# Patient Record
Sex: FEMALE | Race: WHITE | NOT HISPANIC OR LATINO | Employment: PART TIME | URBAN - METROPOLITAN AREA
[De-identification: names, ages, dates, MRNs, and addresses within clinical notes are randomized per-mention and may not be internally consistent; named-entity substitution may affect disease eponyms.]

---

## 2020-06-09 PROBLEM — Z01.419 WELL WOMAN EXAM: Status: ACTIVE | Noted: 2020-06-09

## 2020-06-15 ENCOUNTER — OFFICE VISIT (OUTPATIENT)
Dept: OBSTETRICS AND GYNECOLOGY | Facility: CLINIC | Age: 35
End: 2020-06-15

## 2020-06-15 VITALS
BODY MASS INDEX: 21.76 KG/M2 | WEIGHT: 130.6 LBS | HEIGHT: 65 IN | DIASTOLIC BLOOD PRESSURE: 68 MMHG | SYSTOLIC BLOOD PRESSURE: 126 MMHG

## 2020-06-15 DIAGNOSIS — Z63.0 PARTNER RELATIONAL PROBLEM: ICD-10-CM

## 2020-06-15 DIAGNOSIS — Z01.419 WELL WOMAN EXAM: Primary | ICD-10-CM

## 2020-06-15 PROCEDURE — 99385 PREV VISIT NEW AGE 18-39: CPT | Performed by: OBSTETRICS & GYNECOLOGY

## 2020-06-15 RX ORDER — ETONOGESTREL AND ETHINYL ESTRADIOL 11.7; 2.7 MG/1; MG/1
1 INSERT, EXTENDED RELEASE VAGINAL
COMMUNITY
End: 2020-06-15 | Stop reason: SDUPTHER

## 2020-06-15 RX ORDER — ETONOGESTREL AND ETHINYL ESTRADIOL 11.7; 2.7 MG/1; MG/1
1 INSERT, EXTENDED RELEASE VAGINAL
Qty: 3 EACH | Refills: 4 | Status: SHIPPED | OUTPATIENT
Start: 2020-06-15 | End: 2021-08-03 | Stop reason: SDUPTHER

## 2020-06-15 SDOH — SOCIAL STABILITY - SOCIAL INSECURITY: PROBLEMS IN RELATIONSHIP WITH SPOUSE OR PARTNER: Z63.0

## 2020-06-15 NOTE — PROGRESS NOTES
Subjective   Chief Complaint   Patient presents with   • Gynecologic Exam     annual; no c/o     Dary Crawley is a 35 y.o. year old  presenting to be seen for her annual exam.     SEXUAL Hx:  She is currently sexually active.  In the past year there there has been NO new sexual partners.    Condoms are never used.  She would like to be screened for STD's at today's exam. Her partner had an affair in .   Current birth control method: NuvaRing.  She is happy with her current method of contraception and does not want to discuss alternative methods of contraception.  MENSTRUAL Hx:  Patient's last menstrual period was 2020 (exact date).  In the past 6 months her cycles have been regular, predictable and occur monthly.  Her menstrual flow is typically normal.   Each month on average there are roughly 0 day(s) of very heavy flow.    Duration 3-4 days.  Intermenstrual bleeding is absent.    Post-coital bleeding is absent.  Dysmenorrhea: mild and is not affecting her activities of daily living  PMS: mild and is not affecting her activities of daily living  Her cycles are not a source of concern for her that she wishes to discuss today.  HEALTH Hx:  She exercises regularly: yes.  She wears her seat belt: yes.  She has concerns about domestic violence: no.  OTHER THINGS SHE WANTS TO DISCUSS TODAY:  Nothing else    The following portions of the patient's history were reviewed and updated as appropriate:problem list, current medications, allergies, past family history, past medical history, past social history and past surgical history.    Social History    Tobacco Use      Smoking status: Never Smoker      Smokeless tobacco: Never Used    Review of Systems  Constitutional POS: nothing reported    NEG: anorexia or night sweats   Genitourinary POS: nothing reported    NEG: dysuria or hematuria      Gastointestinal POS: nothing reported    NEG: bloating, change in bowel habits, melena or reflux symptoms  "  Integument POS: nothing reported    NEG: moles that are changing in size, shape, color or rashes   Breast POS: nothing reported    NEG: persistent breast lump, skin dimpling or nipple discharge        Objective   /68   Ht 165.1 cm (65\")   Wt 59.2 kg (130 lb 9.6 oz)   LMP 05/27/2020 (Exact Date)   Breastfeeding No   BMI 21.73 kg/m²     General:  well developed; well nourished  no acute distress   Skin:  No suspicious lesions seen   Thyroid: normal to inspection and palpation   Breasts:  Examined in supine position  Symmetric without masses or skin dimpling  Nipples normal without inversion, lesions or discharge  There are no palpable axillary nodes   Abdomen: soft, non-tender; no masses  no umbilical or inguinal hernias are present  no hepato-splenomegaly   Pelvis: Clinical staff was present for exam  External genitalia:  normal appearance of the external genitalia including Bartholin's and [place]'s glands.  :  urethral meatus normal;  Vaginal:  normal pink mucosa without prolapse or lesions.  Cervix:  normal appearance.  Uterus:  normal size, shape and consistency.  Adnexa:  normal bimanual exam of the adnexa.  Rectal:  digital rectal exam not performed; anus visually normal appearing.  Small amount of bleeding on external os - silver nitrate applied        Assessment   1. Normal GYN exam     Plan   Pap and HPV were done today.  If she does not receive the results of the Pap within 2 weeks  time, she was instructed to call to find out the results.  I explained to Dary that the recommendations for Pap smear interval in a low risk patient's has lengthened to 5 years time if both cytology and HPV testing were normal.  I encouraged her to be seen yearly for a full physical exam including breast and pelvic exam even during the off years when PAP's will not be performed.  The following data needs to be obtained to update her medical records: last PAP.  The following tests were ordered today: STD swabs for " GC, chlamydia and trichimoniasis.  It was explained to Dary that all lab test should be back within the one week after they are performed. She will be notified about the results, regardless of the findings. If she has not been contacted by the office within 2 weeks after the test has been performed, it is her responsibility to contact us to learn about her results  Prescription(s) for Nuvaring were refilled today   The importance of keeping all planned follow-up and taking all medications as prescribed was emphasized.  Follow up for annual exam in one year    New Medications Ordered This Visit   Medications   • etonogestrel-ethinyl estradiol (NUVARING) 0.12-0.015 MG/24HR vaginal ring     Sig: Insert 1 each into the vagina Every 28 (Twenty-Eight) Days. Insert vaginally and leave in place for 3 consecutive weeks, then remove for 1 week.     Dispense:  3 each     Refill:  4          This note was electronically signed.    Micki Rodirguez MD  Isatu 15, 2020    Note: Speech recognition transcription software may have been used to create portions of this document.  An attempt at proofreading has been made but errors in transcription could still be present.

## 2020-06-18 ENCOUNTER — LAB (OUTPATIENT)
Dept: LAB | Facility: HOSPITAL | Age: 35
End: 2020-06-18

## 2020-06-18 ENCOUNTER — TRANSCRIBE ORDERS (OUTPATIENT)
Dept: LAB | Facility: HOSPITAL | Age: 35
End: 2020-06-18

## 2020-06-18 DIAGNOSIS — I49.9 VENTRICULAR ARRHYTHMIA: ICD-10-CM

## 2020-06-18 DIAGNOSIS — I49.9 VENTRICULAR ARRHYTHMIA: Primary | ICD-10-CM

## 2020-06-18 LAB
ALBUMIN SERPL-MCNC: 4.3 G/DL (ref 3.5–5.2)
ALBUMIN/GLOB SERPL: 1.6 G/DL
ALP SERPL-CCNC: 33 U/L (ref 39–117)
ALT SERPL W P-5'-P-CCNC: 19 U/L (ref 1–33)
ANION GAP SERPL CALCULATED.3IONS-SCNC: 10.4 MMOL/L (ref 5–15)
AST SERPL-CCNC: 15 U/L (ref 1–32)
BILIRUB SERPL-MCNC: 0.6 MG/DL (ref 0.2–1.2)
BUN BLD-MCNC: 13 MG/DL (ref 6–20)
BUN/CREAT SERPL: 15.3 (ref 7–25)
CALCIUM SPEC-SCNC: 9.4 MG/DL (ref 8.6–10.5)
CHLORIDE SERPL-SCNC: 106 MMOL/L (ref 98–107)
CO2 SERPL-SCNC: 22.6 MMOL/L (ref 22–29)
CREAT BLD-MCNC: 0.85 MG/DL (ref 0.57–1)
GFR SERPL CREATININE-BSD FRML MDRD: 75 ML/MIN/1.73
GLOBULIN UR ELPH-MCNC: 2.7 GM/DL
GLUCOSE BLD-MCNC: 102 MG/DL (ref 65–99)
MAGNESIUM SERPL-MCNC: 2 MG/DL (ref 1.6–2.6)
POTASSIUM BLD-SCNC: 4 MMOL/L (ref 3.5–5.2)
PROT SERPL-MCNC: 7 G/DL (ref 6–8.5)
SODIUM BLD-SCNC: 139 MMOL/L (ref 136–145)

## 2020-06-18 PROCEDURE — 80053 COMPREHEN METABOLIC PANEL: CPT

## 2020-06-18 PROCEDURE — 83735 ASSAY OF MAGNESIUM: CPT

## 2020-06-18 PROCEDURE — 36415 COLL VENOUS BLD VENIPUNCTURE: CPT

## 2020-06-19 ENCOUNTER — CONSULT (OUTPATIENT)
Dept: CARDIOLOGY | Facility: CLINIC | Age: 35
End: 2020-06-19

## 2020-06-19 VITALS
WEIGHT: 130 LBS | HEART RATE: 83 BPM | HEIGHT: 68 IN | SYSTOLIC BLOOD PRESSURE: 108 MMHG | DIASTOLIC BLOOD PRESSURE: 64 MMHG | OXYGEN SATURATION: 99 % | BODY MASS INDEX: 19.7 KG/M2

## 2020-06-19 DIAGNOSIS — R07.2 PRECORDIAL CHEST PAIN: ICD-10-CM

## 2020-06-19 DIAGNOSIS — R00.2 PALPITATIONS: Primary | ICD-10-CM

## 2020-06-19 DIAGNOSIS — R55 SYNCOPE AND COLLAPSE: ICD-10-CM

## 2020-06-19 DIAGNOSIS — R06.09 DOE (DYSPNEA ON EXERTION): ICD-10-CM

## 2020-06-19 PROCEDURE — 99204 OFFICE O/P NEW MOD 45 MIN: CPT | Performed by: INTERNAL MEDICINE

## 2020-06-19 PROCEDURE — 93000 ELECTROCARDIOGRAM COMPLETE: CPT | Performed by: INTERNAL MEDICINE

## 2020-06-22 ENCOUNTER — TELEPHONE (OUTPATIENT)
Dept: OBSTETRICS AND GYNECOLOGY | Facility: CLINIC | Age: 35
End: 2020-06-22

## 2020-07-16 ENCOUNTER — HOSPITAL ENCOUNTER (OUTPATIENT)
Dept: CARDIOLOGY | Facility: HOSPITAL | Age: 35
Discharge: HOME | End: 2020-07-16
Admitting: INTERNAL MEDICINE

## 2020-07-16 ENCOUNTER — TELEPHONE (OUTPATIENT)
Dept: CARDIOLOGY | Facility: CLINIC | Age: 35
End: 2020-07-16

## 2020-07-16 VITALS — WEIGHT: 130 LBS | BODY MASS INDEX: 19.7 KG/M2 | HEIGHT: 68 IN

## 2020-07-16 DIAGNOSIS — R00.2 PALPITATIONS: ICD-10-CM

## 2020-07-16 DIAGNOSIS — R55 SYNCOPE AND COLLAPSE: ICD-10-CM

## 2020-07-16 LAB
BH CV ECHO MEAS - AO ROOT AREA (BSA CORRECTED): 1.6
BH CV ECHO MEAS - AO ROOT AREA: 5.7 CM^2
BH CV ECHO MEAS - AO ROOT DIAM: 2.7 CM
BH CV ECHO MEAS - ASC AORTA: 2.2 CM
BH CV ECHO MEAS - BSA(HAYCOCK): 1.7 M^2
BH CV ECHO MEAS - BSA: 1.7 M^2
BH CV ECHO MEAS - BZI_BMI: 19.8 KILOGRAMS/M^2
BH CV ECHO MEAS - BZI_METRIC_HEIGHT: 172.7 CM
BH CV ECHO MEAS - BZI_METRIC_WEIGHT: 59 KG
BH CV ECHO MEAS - EDV(CUBED): 80.1 ML
BH CV ECHO MEAS - EDV(TEICH): 83.5 ML
BH CV ECHO MEAS - EF(CUBED): 61 %
BH CV ECHO MEAS - EF(MOD-BP): 53 %
BH CV ECHO MEAS - EF(TEICH): 52.8 %
BH CV ECHO MEAS - ESV(CUBED): 31.3 ML
BH CV ECHO MEAS - ESV(TEICH): 39.4 ML
BH CV ECHO MEAS - FS: 26.9 %
BH CV ECHO MEAS - IVS/LVPW: 0.88
BH CV ECHO MEAS - IVSD: 0.65 CM
BH CV ECHO MEAS - LA DIMENSION: 3 CM
BH CV ECHO MEAS - LA/AO: 1.1
BH CV ECHO MEAS - LV MASS(C)D: 87.1 GRAMS
BH CV ECHO MEAS - LV MASS(C)DI: 51.2 GRAMS/M^2
BH CV ECHO MEAS - LVIDD: 4.3 CM
BH CV ECHO MEAS - LVIDS: 3.2 CM
BH CV ECHO MEAS - LVPWD: 0.73 CM
BH CV ECHO MEAS - MV A MAX VEL: 66.1 CM/SEC
BH CV ECHO MEAS - MV DEC TIME: 0.18 SEC
BH CV ECHO MEAS - MV E MAX VEL: 90.3 CM/SEC
BH CV ECHO MEAS - MV E/A: 1.4
BH CV ECHO MEAS - PA ACC SLOPE: 262 CM/SEC^2
BH CV ECHO MEAS - PA ACC TIME: 0.16 SEC
BH CV ECHO MEAS - PA PR(ACCEL): 6.1 MMHG
BH CV ECHO MEAS - RAP SYSTOLE: 3 MMHG
BH CV ECHO MEAS - RVDD: 2.6 CM
BH CV ECHO MEAS - RVSP: 20 MMHG
BH CV ECHO MEAS - SI(CUBED): 28.7 ML/M^2
BH CV ECHO MEAS - SI(TEICH): 25.9 ML/M^2
BH CV ECHO MEAS - SV(CUBED): 48.8 ML
BH CV ECHO MEAS - SV(TEICH): 44.1 ML
BH CV ECHO MEAS - TR MAX PG: 17 MMHG
BH CV ECHO MEAS - TR MAX VEL: 208.5 CM/SEC
BH CV VAS BP LEFT ARM: NORMAL MMHG
MAXIMAL PREDICTED HEART RATE: 183 BPM
STRESS TARGET HR: 156 BPM

## 2020-07-16 PROCEDURE — 93306 TTE W/DOPPLER COMPLETE: CPT | Performed by: INTERNAL MEDICINE

## 2020-07-16 PROCEDURE — 93306 TTE W/DOPPLER COMPLETE: CPT

## 2020-07-16 NOTE — TELEPHONE ENCOUNTER
Spoke with pt regarding normal echo results. Advised once monitor results come back I will call her. No further questions at this time.

## 2020-07-16 NOTE — TELEPHONE ENCOUNTER
----- Message from Alejo Shelton MD sent at 7/16/2020  2:07 PM EDT -----  Please inform the patient of their test results. Thank you.

## 2020-07-22 ENCOUNTER — TELEPHONE (OUTPATIENT)
Dept: CARDIOLOGY | Facility: CLINIC | Age: 35
End: 2020-07-22

## 2020-07-22 NOTE — TELEPHONE ENCOUNTER
----- Message from Alejo Shelton MD sent at 7/22/2020  2:50 PM EDT -----  Please inform the patient of their test results. Thank you.

## 2020-12-02 ENCOUNTER — TRANSCRIBE ORDERS (OUTPATIENT)
Dept: PHYSICAL THERAPY | Facility: CLINIC | Age: 35
End: 2020-12-02

## 2020-12-02 DIAGNOSIS — S16.1XXA STRAIN OF NECK MUSCLE, INITIAL ENCOUNTER: Primary | ICD-10-CM

## 2020-12-08 ENCOUNTER — TRANSCRIBE ORDERS (OUTPATIENT)
Dept: PHYSICAL THERAPY | Facility: CLINIC | Age: 35
End: 2020-12-08

## 2020-12-08 DIAGNOSIS — S16.1XXD STRAIN OF NECK MUSCLE, SUBSEQUENT ENCOUNTER: Primary | ICD-10-CM

## 2020-12-10 ENCOUNTER — TREATMENT (OUTPATIENT)
Dept: PHYSICAL THERAPY | Facility: CLINIC | Age: 35
End: 2020-12-10

## 2020-12-10 DIAGNOSIS — S16.1XXA STRAIN OF NECK MUSCLE, INITIAL ENCOUNTER: Primary | ICD-10-CM

## 2020-12-10 PROCEDURE — 97140 MANUAL THERAPY 1/> REGIONS: CPT | Performed by: PHYSICAL THERAPIST

## 2020-12-10 PROCEDURE — 97162 PT EVAL MOD COMPLEX 30 MIN: CPT | Performed by: PHYSICAL THERAPIST

## 2020-12-10 PROCEDURE — 97110 THERAPEUTIC EXERCISES: CPT | Performed by: PHYSICAL THERAPIST

## 2020-12-10 NOTE — PROGRESS NOTES
Physical Therapy Initial Evaluation and Plan of Care    TOTAL TIME: 85 MINUTES    Subjective Evaluation    History of Present Illness  Mechanism of injury: Was performing an ultrasound on the  and felt a pop in the right side of her neck, immediate pain    Continued pain in the wrists    Uses wrist braces to sleep prn    Has been using heat/ice/ibuprofen    Pain with sleeping; worse in the evenings        Patient Occupation: sonographer  Pain  Current pain ratin  Quality: sharp, discomfort and tight  Relieving factors: ice, heat, medications and change in position    Patient Goals  Patient goals for therapy: decreased pain, return to work, return to sport/leisure activities, independence with ADLs/IADLs and increased strength             Objective        Special Questions      Additional Special Questions  Denies red flag symptoms      Postural Observations  Seated posture: fair  Standing posture: fair        Palpation   Left   Tenderness of the cervical paraspinals, scalenes, sternocleidomastoid, suboccipitals and upper trapezius.     Tenderness   Cervical Spine   Tenderness in the facet joint.     Neurological Testing     Sensation   Cervical/Thoracic   Left   Intact: light touch    Right   Intact: light touch    Reflexes   Left   Biceps (C5/C6): normal (2+)  Brachioradialis (C6): normal (2+)  Triceps (C7): normal (2+)    Right   Biceps (C5/C6): normal (2+)  Brachioradialis (C6): normal (2+)  Triceps (C7): normal (2+)    Active Range of Motion     Additional Active Range of Motion Details  Cervical ROM limited ~ 50% all directions L>R    Strength/Myotome Testing   Cervical Spine     Left   Normal strength    Right   Normal strength    Tests   Cervical     Left   Positive active compression ([place]), cervical distraction and Spurling's sign.         Access Code: [email or web address]   URL: [email or web address]   Date: 12/10/2020   Prepared by: Eddie Devine     Exercises   Seated Levator Scapulae  Stretch - 10 reps - 1 sets - 10 seconds hold - 3x daily - 7x weekly   Seated Upper Trapezius Stretch - 10 reps - 1 sets - 10 seconds hold - 3x daily - 7x weekly   Seated Assisted Cervical Rotation with Towel - 10 reps - 3 sets - 3x daily - 7x weekly   Supine Cervical Retraction with Towel - 10 reps - 3 sets - 3x daily - 7x weekly   Sternocleidomastoid Stretch - 10 reps - 1 sets - 10 seconds hold - 3x daily - 7x weekly   Upper Trapezius Stretch - 10 reps - 1 sets - 10 seconds hold - 3x daily - 7x weekly   Seated Upper Thoracic Stretch - 10 reps - 3 sets - 3x daily - 7x weekly     Assessment & Plan     Assessment  Impairments: abnormal muscle tone, abnormal or restricted ROM, lacks appropriate home exercise program and pain with function  Assessment details: Patient presents with s/s consistent with cervical strain L>R; she has limited ROM and pain with movement and sleeping; neuro intact; she should respond well to PT for modalities prn, manual therapy, therapeutic exercise in order to RTW on full duty without pain or dysfunction  Prognosis: fair  Functional Limitations: carrying objects, lifting, sleeping, pulling, uncomfortable because of pain, moving in bed, sitting and reaching overhead  Goals  Plan Goals: 4 weeks:  1. IND with HEP  2. Patient to display full ROM without pain  3. Patient to perform work simulated tasks without pain  4. Patient to have 50% improvement on NDI    Plan  Therapy options: will be seen for skilled physical therapy services  Planned modality interventions: iontophoresis, microcurrent electrical stimulation, TENS, thermotherapy (hydrocollator packs), ultrasound, traction, high voltage pulsed current (pain management), electrical stimulation/Russian stimulation, cryotherapy and dry needling  Planned therapy interventions: manual therapy, neuromuscular re-education, postural training, soft tissue mobilization, stretching, strengthening, spinal/joint mobilization, therapeutic activities,  joint mobilization, home exercise program, functional ROM exercises, flexibility and body mechanics training  Frequency: 2x week  Duration in visits: 8  Treatment plan discussed with: patient        Manual Therapy:    15     mins  96654;  Therapeutic Exercise:    30     mins  31944;     Neuromuscular Phill:        mins  71376;    Therapeutic Activity:          mins  70705;     Gait Training:           mins  61735;     Ultrasound:          mins  11506;    Electrical Stimulation:    15     mins  55367 ( );  Dry Needling          mins self-pay    Timed Treatment:   45   mins   Total Treatment:     85   mins    PT SIGNATURE: Eddie Devine, PT   DATE TREATMENT INITIATED: 12/10/2020    Initial Certification  Certification Period: 3/10/2021  I certify that the therapy services are furnished while this patient is under my care.  The services outlined above are required by this patient, and will be reviewed every 90 days.     PHYSICIAN: Kevon Scott Jr., APRN      DATE:     Please sign and return via fax to  .. Thank you, [facility].

## 2020-12-19 ENCOUNTER — IMMUNIZATION (OUTPATIENT)
Dept: VACCINE CLINIC | Facility: HOSPITAL | Age: 35
End: 2020-12-19

## 2020-12-19 PROCEDURE — 0001A: CPT | Performed by: INTERNAL MEDICINE

## 2020-12-19 PROCEDURE — 91300 HC SARSCOV02 VAC 30MCG/0.3ML IM: CPT | Performed by: INTERNAL MEDICINE

## 2020-12-21 ENCOUNTER — TREATMENT (OUTPATIENT)
Dept: PHYSICAL THERAPY | Facility: CLINIC | Age: 35
End: 2020-12-21

## 2020-12-21 PROCEDURE — 97140 MANUAL THERAPY 1/> REGIONS: CPT | Performed by: PHYSICAL THERAPIST

## 2020-12-21 PROCEDURE — 97110 THERAPEUTIC EXERCISES: CPT | Performed by: PHYSICAL THERAPIST

## 2020-12-21 PROCEDURE — 97014 ELECTRIC STIMULATION THERAPY: CPT | Performed by: PHYSICAL THERAPIST

## 2020-12-21 NOTE — PROGRESS NOTES
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     THERAPEUTIC EXERCISES/ACTIVITIES ADDED TODAY: see flow sheets     Full cervical ROM    Manual: cervical mobilizations x 15 minutes    Assessment/Plan  PATIENT NEEDS CONTINUED PHYSICAL THERAPY IN ORDER TO ACHIEVE FULL ROM, STRENGTH AND FUNCTION WITH NO REPORTS OF PAIN IN ORDER TO RTW WITHOUT RESTRICTIONS AND WITHOUT PAIN OR DYSFUNCTION        Progress per Plan of Care           Manual Therapy:    15     mins  80933;  Therapeutic Exercise:    30     mins  46000;     Neuromuscular Phill:        mins  63691;    Therapeutic Activity:          mins  50536;     Gait Training:           mins  75397;     Ultrasound:          mins  83579;    Electrical Stimulation:    15     mins  25505 ( );  Dry Needling          mins self-pay    Timed Treatment:   45   mins   Total Treatment:     60   mins    Eddie Devine PT  Physical Therapist

## 2020-12-28 ENCOUNTER — TREATMENT (OUTPATIENT)
Dept: PHYSICAL THERAPY | Facility: CLINIC | Age: 35
End: 2020-12-28

## 2020-12-28 DIAGNOSIS — S16.1XXA STRAIN OF NECK MUSCLE, INITIAL ENCOUNTER: Primary | ICD-10-CM

## 2020-12-28 PROCEDURE — 97140 MANUAL THERAPY 1/> REGIONS: CPT | Performed by: PHYSICAL THERAPIST

## 2020-12-28 PROCEDURE — 97110 THERAPEUTIC EXERCISES: CPT | Performed by: PHYSICAL THERAPIST

## 2020-12-28 PROCEDURE — 97014 ELECTRIC STIMULATION THERAPY: CPT | Performed by: PHYSICAL THERAPIST

## 2020-12-28 NOTE — PROGRESS NOTES
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     THERAPEUTIC EXERCISES/ACTIVITIES ADDED TODAY: seated thoracic extensions     Manual thoracic and cervical mobilizations x 25 minutes    Assessment/Plan  PATIENT NEEDS CONTINUED PHYSICAL THERAPY IN ORDER TO ACHIEVE FULL ROM, STRENGTH AND FUNCTION WITH NO REPORTS OF PAIN IN ORDER TO RTW WITHOUT RESTRICTIONS AND WITHOUT PAIN OR DYSFUNCTION ; SCANNING SEEMS TO AGGRAVATE PATIENT'S SYMPTOMS PER PATIENT REPORT- SCANNING REQUIRES HER TO PUSH HARD AND SOMETIMES AT AWKWARD ANGLES FOR PROLONGED PERIODS OF TIME, WHICH EXACERBATES PATIENTS RADICULAR PAIN AND CERVICAL STIFFNESS    Progress per Plan of Care           Manual Therapy:    25     mins  11160;  Therapeutic Exercise:    15     mins  98568;     Neuromuscular Phill:        mins  23533;    Therapeutic Activity:          mins  36215;     Gait Training:           mins  27275;     Ultrasound:          mins  58382;    Electrical Stimulation:    15     mins  97472 ( );  Dry Needling          mins self-pay    Timed Treatment:   40   mins   Total Treatment:     55   mins    Eddie Devine, PT  Physical Therapist

## 2020-12-31 ENCOUNTER — TREATMENT (OUTPATIENT)
Dept: PHYSICAL THERAPY | Facility: CLINIC | Age: 35
End: 2020-12-31

## 2020-12-31 DIAGNOSIS — S16.1XXA STRAIN OF NECK MUSCLE, INITIAL ENCOUNTER: Primary | ICD-10-CM

## 2020-12-31 PROCEDURE — 97014 ELECTRIC STIMULATION THERAPY: CPT | Performed by: PHYSICAL THERAPIST

## 2020-12-31 PROCEDURE — 97140 MANUAL THERAPY 1/> REGIONS: CPT | Performed by: PHYSICAL THERAPIST

## 2020-12-31 PROCEDURE — 97110 THERAPEUTIC EXERCISES: CPT | Performed by: PHYSICAL THERAPIST

## 2020-12-31 NOTE — PROGRESS NOTES
Physical Therapy Daily Progress Note    TOTAL TIME: 50 MINUTES    Dary Crawley reports: neck feels a little better; saw provider this am for f/u- got a Medrol dosepak; off work next week, hoping that combo will decrease the pain a lot; pain increases mostly with a lot of long, difficult days at work when I have to push a lot with my arms      Objective   See Exercise, Manual, and Modality Logs for complete treatment.     Tenderness throughout t/s p/s mm and spinous processes  Tenderness with thoracic PA's    THERAPEUTIC EXERCISES/ACTIVITIES ADDED TODAY: see flow sheets     Assessment/Plan  PATIENT NEEDS CONTINUED PHYSICAL THERAPY IN ORDER TO ACHIEVE FULL ROM, STRENGTH AND FUNCTION WITH NO REPORTS OF PAIN IN ORDER TO RTW WITHOUT RESTRICTIONS AND WITHOUT PAIN OR DYSFUNCTION; LIKELY WILL BENEFIT FROM THORACIC/SCAPULAR STABILIZATION TO ASSIST WITH JOB DEMANDS, ONCE NECK PAIN IS ALLEVIATED        Progress per Plan of Care           Manual Therapy:     25     mins  12616;  Therapeutic Exercise:    10     mins  11641;     Neuromuscular Phill:        mins  19417;    Therapeutic Activity:          mins  15151;     Gait Training:           mins  17904;     Ultrasound:          mins  23028;    Electrical Stimulation:    15     mins  29448 ( );  Dry Needling          mins self-pay    Timed Treatment:   35   mins   Total Treatment:     50   mins    Eddie Devine, PT  Physical Therapist

## 2021-01-04 ENCOUNTER — TREATMENT (OUTPATIENT)
Dept: PHYSICAL THERAPY | Facility: CLINIC | Age: 36
End: 2021-01-04

## 2021-01-04 DIAGNOSIS — S16.1XXA STRAIN OF NECK MUSCLE, INITIAL ENCOUNTER: Primary | ICD-10-CM

## 2021-01-04 PROCEDURE — 97110 THERAPEUTIC EXERCISES: CPT | Performed by: PHYSICAL THERAPIST

## 2021-01-04 PROCEDURE — 97140 MANUAL THERAPY 1/> REGIONS: CPT | Performed by: PHYSICAL THERAPIST

## 2021-01-04 PROCEDURE — 97014 ELECTRIC STIMULATION THERAPY: CPT | Performed by: PHYSICAL THERAPIST

## 2021-01-08 ENCOUNTER — TREATMENT (OUTPATIENT)
Dept: PHYSICAL THERAPY | Facility: CLINIC | Age: 36
End: 2021-01-08

## 2021-01-08 DIAGNOSIS — S16.1XXA STRAIN OF NECK MUSCLE, INITIAL ENCOUNTER: Primary | ICD-10-CM

## 2021-01-08 PROCEDURE — 97140 MANUAL THERAPY 1/> REGIONS: CPT | Performed by: PHYSICAL THERAPIST

## 2021-01-08 PROCEDURE — 97110 THERAPEUTIC EXERCISES: CPT | Performed by: PHYSICAL THERAPIST

## 2021-01-08 NOTE — PROGRESS NOTES
Physical Therapy Daily Progress Note    TOTAL TIME: 75 MINUTES    Dary Crawley reports: working less this week, feeling better overall; worked yesterday, felt 'fatigue' in lower neck, upper traps      Objective   See Exercise, Manual, and Modality Logs for complete treatment.     THERAPEUTIC EXERCISES/ACTIVITIES ADDED TODAY: tband ER, 1 arm retraction at 90 deg abd, rows, pulls; issued red tband for hep    Assessment/Plan  Full cervical ROM, TTP right lower cervical facets, weakness with periscapular mm; fatigues quickly with scapular strengthening     Progress per Plan of Care and Progress strengthening /stabilization /functional activity           Manual Therapy:    15     mins  41719;  Therapeutic Exercise:    45     mins  13065;     Neuromuscular Phill:        mins  44739;    Therapeutic Activity:          mins  51380;     Gait Training:           mins  79997;     Ultrasound:          mins  84932;    Electrical Stimulation:    15     mins  54427 ( );  Dry Needling          mins self-pay    Timed Treatment:   60   mins   Total Treatment:     75   mins    Eddie Devine, PT  Physical Therapist

## 2021-01-09 ENCOUNTER — IMMUNIZATION (OUTPATIENT)
Dept: VACCINE CLINIC | Facility: HOSPITAL | Age: 36
End: 2021-01-09

## 2021-01-09 PROCEDURE — 0001A: CPT | Performed by: INTERNAL MEDICINE

## 2021-01-09 PROCEDURE — 0002A: CPT | Performed by: INTERNAL MEDICINE

## 2021-01-09 PROCEDURE — 91300 HC SARSCOV02 VAC 30MCG/0.3ML IM: CPT | Performed by: INTERNAL MEDICINE

## 2021-01-11 ENCOUNTER — TREATMENT (OUTPATIENT)
Dept: PHYSICAL THERAPY | Facility: CLINIC | Age: 36
End: 2021-01-11

## 2021-01-11 DIAGNOSIS — S16.1XXA STRAIN OF NECK MUSCLE, INITIAL ENCOUNTER: Primary | ICD-10-CM

## 2021-01-11 PROCEDURE — 97110 THERAPEUTIC EXERCISES: CPT | Performed by: PHYSICAL THERAPIST

## 2021-01-11 PROCEDURE — 97530 THERAPEUTIC ACTIVITIES: CPT | Performed by: PHYSICAL THERAPIST

## 2021-01-11 PROCEDURE — 97140 MANUAL THERAPY 1/> REGIONS: CPT | Performed by: PHYSICAL THERAPIST

## 2021-01-11 NOTE — PROGRESS NOTES
Physical Therapy Daily Progress Note    TOTAL TIME: 75 MINUTES    Dary Crawley reports: feels pretty good, neck is getting better; feel 80% better, need to get stronger      Objective   See Exercise, Manual, and Modality Logs for complete treatment.     THERAPEUTIC EXERCISES/ACTIVITIES ADDED TODAY: see flow sheets ; consider chair dips, prone planks (HEP), green tband CKC 1 arm row with rotation ; 15 min of therapeutic activity     Assessment/Plan  PATIENT NEEDS CONTINUED PHYSICAL THERAPY IN ORDER TO ACHIEVE FULL ROM, STRENGTH AND FUNCTION WITH NO REPORTS OF PAIN IN ORDER TO RTW WITHOUT RESTRICTIONS AND WITHOUT PAIN OR DYSFUNCTION ; WEAKNESS IN RTC, SCAPULAR RETRACTORS AND DEPRESSORS CAUSE INCREASED RECRUITMENT OF SCM, SCALENES, ETC WHILE DOING SCANS AND LIKELY LEAD TO CERVICAL PAIN; NEEDS INCREASED STRENGTH OF SCAPULAR STABILIZERS AND RTC      Progress per Plan of Care           Manual Therapy:    15     mins  24083;  Therapeutic Exercise:    30     mins  55776;     Neuromuscular Phill:        mins  34721;    Therapeutic Activity:     15     mins  18256;     Gait Training:           mins  59918;     Ultrasound:          mins  13387;    Electrical Stimulation:    15     mins  46536 ( );  Dry Needling          mins self-pay    Timed Treatment:   60   mins   Total Treatment:     75   mins    Eddie Devine, PT  Physical Therapist

## 2021-01-13 ENCOUNTER — TELEPHONE (OUTPATIENT)
Dept: PHYSICAL THERAPY | Facility: CLINIC | Age: 36
End: 2021-01-13

## 2021-02-04 ENCOUNTER — OFFICE VISIT (OUTPATIENT)
Dept: INTERNAL MEDICINE | Facility: CLINIC | Age: 36
End: 2021-02-04

## 2021-02-04 VITALS
HEART RATE: 73 BPM | TEMPERATURE: 97.3 F | OXYGEN SATURATION: 98 % | RESPIRATION RATE: 16 BRPM | SYSTOLIC BLOOD PRESSURE: 116 MMHG | BODY MASS INDEX: 20.31 KG/M2 | HEIGHT: 68 IN | DIASTOLIC BLOOD PRESSURE: 70 MMHG | WEIGHT: 134 LBS

## 2021-02-04 DIAGNOSIS — F41.1 GENERALIZED ANXIETY DISORDER: ICD-10-CM

## 2021-02-04 DIAGNOSIS — F33.1 MODERATE EPISODE OF RECURRENT MAJOR DEPRESSIVE DISORDER (HCC): Primary | ICD-10-CM

## 2021-02-04 DIAGNOSIS — Z13.29 THYROID DISORDER SCREENING: ICD-10-CM

## 2021-02-04 DIAGNOSIS — Z13.21 ENCOUNTER FOR VITAMIN DEFICIENCY SCREENING: ICD-10-CM

## 2021-02-04 DIAGNOSIS — Z13.220 LIPID SCREENING: ICD-10-CM

## 2021-02-04 DIAGNOSIS — Z13.1 SCREENING FOR DIABETES MELLITUS: ICD-10-CM

## 2021-02-04 DIAGNOSIS — Z13.0 SCREENING FOR BLOOD DISEASE: ICD-10-CM

## 2021-02-04 PROCEDURE — 99204 OFFICE O/P NEW MOD 45 MIN: CPT | Performed by: NURSE PRACTITIONER

## 2021-02-04 RX ORDER — DIPHENOXYLATE HYDROCHLORIDE AND ATROPINE SULFATE 2.5; .025 MG/1; MG/1
1 TABLET ORAL DAILY
COMMUNITY

## 2021-02-04 RX ORDER — PROPRANOLOL HYDROCHLORIDE 20 MG/1
10-20 TABLET ORAL 3 TIMES DAILY PRN
Qty: 60 TABLET | Refills: 2 | Status: SHIPPED | OUTPATIENT
Start: 2021-02-04 | End: 2021-09-10 | Stop reason: SDUPTHER

## 2021-02-04 RX ORDER — CYCLOBENZAPRINE HCL 10 MG
10 TABLET ORAL EVERY 8 HOURS PRN
COMMUNITY
Start: 2020-12-02 | End: 2021-09-10

## 2021-02-04 RX ORDER — FLUOXETINE HYDROCHLORIDE 20 MG/1
20 CAPSULE ORAL DAILY
Qty: 30 CAPSULE | Refills: 1 | Status: SHIPPED | OUTPATIENT
Start: 2021-02-04 | End: 2021-03-05 | Stop reason: SDUPTHER

## 2021-02-04 RX ORDER — IBUPROFEN 800 MG/1
800 TABLET ORAL EVERY 8 HOURS PRN
COMMUNITY
Start: 2021-01-15 | End: 2021-02-21

## 2021-02-04 RX ORDER — MAGNESIUM 200 MG
1 TABLET ORAL DAILY
COMMUNITY

## 2021-02-04 RX ORDER — DICLOFENAC EPOLAMINE 0.01 G/1
1 SYSTEM TOPICAL 2 TIMES DAILY
COMMUNITY
Start: 2021-01-19 | End: 2021-02-21

## 2021-02-04 RX ORDER — CHLORAL HYDRATE 500 MG
1000 CAPSULE ORAL
COMMUNITY

## 2021-02-04 NOTE — PROGRESS NOTES
Subjective   Chief Complaint   Patient presents with   • Anxiety      Dary Crawley is a 35 y.o. female here today for anxiety and depression. Has been suffering with symptoms for some time but this has been worse recently. Having increased nervousness and episodes of sadness. No thoughts of self harm or harming others. She is sleeping well. Needs yearly screening labs done.     I have reviewed the following portions of the patient's history and confirmed they are accurate: allergies, current medications, past family history, past medical history, past social history, past surgical history and problem list    I have personally completed the patient's review of systems.    Review of Systems   Constitutional: Negative for activity change, appetite change, chills, diaphoresis, fatigue, fever, unexpected weight gain and unexpected weight loss.   HENT: Negative for ear discharge, ear pain, mouth sores, nosebleeds, sinus pressure, sneezing and sore throat.    Eyes: Negative for pain, discharge and itching.   Respiratory: Negative for cough, chest tightness, shortness of breath and wheezing.    Cardiovascular: Negative for chest pain, palpitations and leg swelling.   Gastrointestinal: Negative for abdominal pain, constipation, diarrhea, nausea and vomiting.   Endocrine: Negative for heat intolerance, polydipsia and polyphagia.   Genitourinary: Negative for dysuria, flank pain, frequency, hematuria and urgency.   Musculoskeletal: Negative for arthralgias, back pain, gait problem, joint swelling, myalgias, neck pain and neck stiffness.   Skin: Negative for color change, pallor and rash.   Allergic/Immunologic: Negative for immunocompromised state.   Neurological: Negative for seizures, speech difficulty, weakness and numbness.   Hematological: Negative for adenopathy.   Psychiatric/Behavioral: Positive for depressed mood and stress. Negative for agitation, decreased concentration, sleep disturbance and suicidal ideas. The  "patient is nervous/anxious.        Current Outpatient Medications on File Prior to Visit   Medication Sig   • Etonogestrel-Ethinyl Estradiol (NUVARING VA) Insert  into the vagina.   • Magnesium 200 MG tablet Take 1 capsule by mouth Daily.   • multivitamin (MULTI-VITAMIN DAILY PO) Take 1 tablet by mouth Daily.   • Omega-3 Fatty Acids (fish oil) 1000 MG capsule capsule Take 1,000 mg by mouth Daily With Breakfast.   • cyclobenzaprine (FLEXERIL) 10 MG tablet Take 10 mg by mouth Every 8 (Eight) Hours As Needed.   • [DISCONTINUED] Diclofenac Epolamine (FLECTOR) 1.3 % patch patch Apply 1 patch topically to the appropriate area as directed 2 (two) times a day.   • [DISCONTINUED] etonogestrel-ethinyl estradiol (NUVARING) 0.12-0.015 MG/24HR vaginal ring Insert 1 each into the vagina Every 28 (Twenty-Eight) Days. Insert vaginally and leave in place for 3 consecutive weeks, then remove for 1 week.   • [DISCONTINUED] ibuprofen (ADVIL,MOTRIN) 800 MG tablet Take 800 mg by mouth Every 8 (Eight) Hours As Needed. for pain     No current facility-administered medications on file prior to visit.        Objective   Vitals:    02/04/21 1400   BP: 116/70   Pulse: 73   Resp: 16   Temp: 97.3 °F (36.3 °C)   TempSrc: Temporal   SpO2: 98%   Weight: 60.8 kg (134 lb)   Height: 172.7 cm (67.99\")   PainSc: 0-No pain     Body mass index is 20.38 kg/m².    Physical Exam  Vitals signs reviewed.   Constitutional:       Appearance: Normal appearance. She is well-developed.   HENT:      Head: Normocephalic and atraumatic.      Nose: Nose normal.   Eyes:      General: Lids are normal.      Conjunctiva/sclera: Conjunctivae normal.      Pupils: Pupils are equal, round, and reactive to light.   Neck:      Thyroid: No thyromegaly.      Trachea: Trachea normal.   Cardiovascular:      Rate and Rhythm: Normal rate and regular rhythm.      Heart sounds: Normal heart sounds.   Pulmonary:      Effort: Pulmonary effort is normal. No respiratory distress.      " Breath sounds: Normal breath sounds.   Skin:     General: Skin is warm and dry.   Neurological:      Mental Status: She is alert and oriented to person, place, and time.      GCS: GCS eye subscore is 4. GCS verbal subscore is 5. GCS motor subscore is 6.   Psychiatric:         Attention and Perception: Attention normal.         Mood and Affect: Mood is anxious and depressed.         Speech: Speech normal.         Behavior: Behavior normal. Behavior is cooperative.         Thought Content: Thought content normal.         Assessment/Plan   Problem List Items Addressed This Visit     None      Visit Diagnoses     Moderate episode of recurrent major depressive disorder (CMS/HCC)    -  Primary  Chronic issue unstable requiring medication management and monitoring. Will eat well balanced diet, increase water intake, increase physical activity during the day, and get adequate rest. Discussed relaxation and coping skills and exercises.   Start prozac     Relevant Medications    FLUoxetine (PROzac) 20 MG capsule    Generalized anxiety disorder      Chronic issue unstable requiring medication management and monitoring. Will eat well balanced diet, increase water intake, increase physical activity during the day, and get adequate rest. Discussed relaxation and coping skills and exercises.   Start prozac daily and propranolol PRN    Relevant Medications    propranolol (INDERAL) 20 MG tablet    FLUoxetine (PROzac) 20 MG capsule    Screening for blood disease        Relevant Orders    CBC (No Diff) (Completed)    Comprehensive Metabolic Panel (Completed)    Lipid Panel (Completed)    TSH Rfx On Abnormal To Free T4 (Completed)    Hemoglobin A1c (Completed)    Vitamin D 25 Hydroxy (Completed)    Vitamin B12 & Folate (Completed)    Thyroid disorder screening        Relevant Orders    TSH Rfx On Abnormal To Free T4 (Completed)    Lipid screening        Relevant Orders    Lipid Panel (Completed)    Encounter for vitamin deficiency  screening        Relevant Orders    Vitamin D 25 Hydroxy (Completed)    Vitamin B12 & Folate (Completed)    Screening for diabetes mellitus        Relevant Orders    Hemoglobin A1c (Completed)             Current Outpatient Medications:   •  Etonogestrel-Ethinyl Estradiol (NUVARING VA), Insert  into the vagina., Disp: , Rfl:   •  Magnesium 200 MG tablet, Take 1 capsule by mouth Daily., Disp: , Rfl:   •  multivitamin (MULTI-VITAMIN DAILY PO), Take 1 tablet by mouth Daily., Disp: , Rfl:   •  Omega-3 Fatty Acids (fish oil) 1000 MG capsule capsule, Take 1,000 mg by mouth Daily With Breakfast., Disp: , Rfl:   •  cyclobenzaprine (FLEXERIL) 10 MG tablet, Take 10 mg by mouth Every 8 (Eight) Hours As Needed., Disp: , Rfl:   •  FLUoxetine (PROzac) 20 MG capsule, Take 1 capsule by mouth Daily., Disp: 30 capsule, Rfl: 1  •  propranolol (INDERAL) 20 MG tablet, Take 0.5-1 tablets by mouth 3 (Three) Times a Day As Needed for anxiety, Disp: 60 tablet, Rfl: 2       Plan of care reviewed with the patient at the conclusion of today's visit.  Education was provided regarding diagnosis, management, and any prescribed or recommended OTC medications.  Patient verbalized understanding of and agreement with management plan.     Return in about 1 month (around 3/4/2021), or if symptoms worsen or fail to improve.      AYANA Reyes    Please note that portions of this note were completed with a voice recognition program. Efforts were made to edit the dictations, but occasionally words are mistranscribed.

## 2021-02-04 NOTE — PROGRESS NOTES
"Subjective   Chief Complaint   Patient presents with   • Anxiety      Dary Crawley is a 35 y.o. female here today for ***.    Do fast labs orders.     Last year having palpitations  I have reviewed the following portions of the patient's history and confirmed they are accurate: allergies, current medications, past family history, past medical history, past social history, past surgical history and problem list    I have personally completed the patient's review of systems.    Review of Systems    Current Outpatient Medications on File Prior to Visit   Medication Sig   • Etonogestrel-Ethinyl Estradiol (NUVARING VA) Insert  into the vagina.   • Magnesium 200 MG tablet Take 1 capsule by mouth Daily.   • multivitamin (MULTI-VITAMIN DAILY PO) Take 1 tablet by mouth Daily.   • Omega-3 Fatty Acids (fish oil) 1000 MG capsule capsule Take 1,000 mg by mouth Daily With Breakfast.   • cyclobenzaprine (FLEXERIL) 10 MG tablet Take 10 mg by mouth Every 8 (Eight) Hours As Needed.   • Diclofenac Epolamine (FLECTOR) 1.3 % patch patch Apply 1 patch topically to the appropriate area as directed 2 (two) times a day.   • etonogestrel-ethinyl estradiol (NUVARING) 0.12-0.015 MG/24HR vaginal ring Insert 1 each into the vagina Every 28 (Twenty-Eight) Days. Insert vaginally and leave in place for 3 consecutive weeks, then remove for 1 week.   • ibuprofen (ADVIL,MOTRIN) 800 MG tablet Take 800 mg by mouth Every 8 (Eight) Hours As Needed. for pain     No current facility-administered medications on file prior to visit.        Objective   Vitals:    02/04/21 1400   BP: 116/70   Pulse: 73   Resp: 16   Temp: 97.3 °F (36.3 °C)   TempSrc: Temporal   SpO2: 98%   Weight: 60.8 kg (134 lb)   Height: 172.7 cm (67.99\")   PainSc: 0-No pain     Body mass index is 20.38 kg/m².    Physical Exam    Assessment/Plan   Problem List Items Addressed This Visit     None             Current Outpatient Medications:   •  Etonogestrel-Ethinyl Estradiol (NUVARING VA), " Insert  into the vagina., Disp: , Rfl:   •  Magnesium 200 MG tablet, Take 1 capsule by mouth Daily., Disp: , Rfl:   •  multivitamin (MULTI-VITAMIN DAILY PO), Take 1 tablet by mouth Daily., Disp: , Rfl:   •  Omega-3 Fatty Acids (fish oil) 1000 MG capsule capsule, Take 1,000 mg by mouth Daily With Breakfast., Disp: , Rfl:   •  cyclobenzaprine (FLEXERIL) 10 MG tablet, Take 10 mg by mouth Every 8 (Eight) Hours As Needed., Disp: , Rfl:   •  Diclofenac Epolamine (FLECTOR) 1.3 % patch patch, Apply 1 patch topically to the appropriate area as directed 2 (two) times a day., Disp: , Rfl:   •  etonogestrel-ethinyl estradiol (NUVARING) 0.12-0.015 MG/24HR vaginal ring, Insert 1 each into the vagina Every 28 (Twenty-Eight) Days. Insert vaginally and leave in place for 3 consecutive weeks, then remove for 1 week., Disp: 3 each, Rfl: 4  •  ibuprofen (ADVIL,MOTRIN) 800 MG tablet, Take 800 mg by mouth Every 8 (Eight) Hours As Needed. for pain, Disp: , Rfl:        Plan of care reviewed with the patient at the conclusion of today's visit.  Education was provided regarding diagnosis, management, and any prescribed or recommended OTC medications.  Patient verbalized understanding of and agreement with management plan.     No follow-ups on file.      AYANA Reyes    Please note that portions of this note were completed with a voice recognition program. Efforts were made to edit the dictations, but occasionally words are mistranscribed.

## 2021-02-18 ENCOUNTER — LAB (OUTPATIENT)
Dept: LAB | Facility: HOSPITAL | Age: 36
End: 2021-02-18

## 2021-02-18 DIAGNOSIS — Z13.220 LIPID SCREENING: ICD-10-CM

## 2021-02-18 DIAGNOSIS — Z13.29 THYROID DISORDER SCREENING: ICD-10-CM

## 2021-02-18 DIAGNOSIS — Z13.1 SCREENING FOR DIABETES MELLITUS: ICD-10-CM

## 2021-02-18 DIAGNOSIS — Z13.0 SCREENING FOR BLOOD DISEASE: ICD-10-CM

## 2021-02-18 DIAGNOSIS — Z13.21 ENCOUNTER FOR VITAMIN DEFICIENCY SCREENING: ICD-10-CM

## 2021-02-18 LAB
25(OH)D3 SERPL-MCNC: 46.9 NG/ML
ALBUMIN SERPL-MCNC: 4.4 G/DL (ref 3.5–5.2)
ALBUMIN/GLOB SERPL: 1.7 G/DL
ALP SERPL-CCNC: 38 U/L (ref 39–117)
ALT SERPL W P-5'-P-CCNC: 20 U/L (ref 1–33)
ANION GAP SERPL CALCULATED.3IONS-SCNC: 10.6 MMOL/L (ref 5–15)
AST SERPL-CCNC: 22 U/L (ref 1–32)
BILIRUB SERPL-MCNC: 1 MG/DL (ref 0–1.2)
BUN SERPL-MCNC: 15 MG/DL (ref 6–20)
BUN/CREAT SERPL: 17 (ref 7–25)
CALCIUM SPEC-SCNC: 9.4 MG/DL (ref 8.6–10.5)
CHLORIDE SERPL-SCNC: 105 MMOL/L (ref 98–107)
CHOLEST SERPL-MCNC: 178 MG/DL (ref 0–200)
CO2 SERPL-SCNC: 23.4 MMOL/L (ref 22–29)
CREAT SERPL-MCNC: 0.88 MG/DL (ref 0.57–1)
DEPRECATED RDW RBC AUTO: 38.5 FL (ref 37–54)
ERYTHROCYTE [DISTWIDTH] IN BLOOD BY AUTOMATED COUNT: 11.8 % (ref 12.3–15.4)
FOLATE SERPL-MCNC: >20 NG/ML (ref 4.78–24.2)
GFR SERPL CREATININE-BSD FRML MDRD: 72 ML/MIN/1.73
GLOBULIN UR ELPH-MCNC: 2.6 GM/DL
GLUCOSE SERPL-MCNC: 84 MG/DL (ref 65–99)
HBA1C MFR BLD: 5.02 % (ref 4.8–5.6)
HCT VFR BLD AUTO: 43.2 % (ref 34–46.6)
HDLC SERPL-MCNC: 46 MG/DL (ref 40–60)
HGB BLD-MCNC: 14.4 G/DL (ref 12–15.9)
LDLC SERPL CALC-MCNC: 112 MG/DL (ref 0–100)
LDLC/HDLC SERPL: 2.38 {RATIO}
MCH RBC QN AUTO: 29.9 PG (ref 26.6–33)
MCHC RBC AUTO-ENTMCNC: 33.3 G/DL (ref 31.5–35.7)
MCV RBC AUTO: 89.8 FL (ref 79–97)
PLATELET # BLD AUTO: 217 10*3/MM3 (ref 140–450)
PMV BLD AUTO: 11.9 FL (ref 6–12)
POTASSIUM SERPL-SCNC: 3.9 MMOL/L (ref 3.5–5.2)
PROT SERPL-MCNC: 7 G/DL (ref 6–8.5)
RBC # BLD AUTO: 4.81 10*6/MM3 (ref 3.77–5.28)
SODIUM SERPL-SCNC: 139 MMOL/L (ref 136–145)
TRIGL SERPL-MCNC: 113 MG/DL (ref 0–150)
TSH SERPL DL<=0.05 MIU/L-ACNC: 1.37 UIU/ML (ref 0.27–4.2)
VIT B12 BLD-MCNC: 260 PG/ML (ref 211–946)
VLDLC SERPL-MCNC: 20 MG/DL (ref 5–40)
WBC # BLD AUTO: 5.55 10*3/MM3 (ref 3.4–10.8)

## 2021-02-18 PROCEDURE — 84443 ASSAY THYROID STIM HORMONE: CPT

## 2021-02-18 PROCEDURE — 82746 ASSAY OF FOLIC ACID SERUM: CPT

## 2021-02-18 PROCEDURE — 82607 VITAMIN B-12: CPT

## 2021-02-18 PROCEDURE — 80061 LIPID PANEL: CPT

## 2021-02-18 PROCEDURE — 83036 HEMOGLOBIN GLYCOSYLATED A1C: CPT

## 2021-02-18 PROCEDURE — 85027 COMPLETE CBC AUTOMATED: CPT

## 2021-02-18 PROCEDURE — 82306 VITAMIN D 25 HYDROXY: CPT

## 2021-02-18 PROCEDURE — 80053 COMPREHEN METABOLIC PANEL: CPT

## 2021-02-24 NOTE — PROGRESS NOTES
My chart message:    Cholesterol is slightly elevated so follow a low-cholesterol and fat diet.  Try to increase your physical activity.  B12 is on the lower end of normal.  Make sure to be taking a multivitamin.  All other labs look wonderful.

## 2021-03-05 ENCOUNTER — OFFICE VISIT (OUTPATIENT)
Dept: INTERNAL MEDICINE | Facility: CLINIC | Age: 36
End: 2021-03-05

## 2021-03-05 VITALS
TEMPERATURE: 97.1 F | SYSTOLIC BLOOD PRESSURE: 114 MMHG | WEIGHT: 132 LBS | RESPIRATION RATE: 16 BRPM | DIASTOLIC BLOOD PRESSURE: 72 MMHG | HEART RATE: 65 BPM | OXYGEN SATURATION: 99 % | BODY MASS INDEX: 20 KG/M2 | HEIGHT: 68 IN

## 2021-03-05 DIAGNOSIS — F33.1 MODERATE EPISODE OF RECURRENT MAJOR DEPRESSIVE DISORDER (HCC): ICD-10-CM

## 2021-03-05 DIAGNOSIS — F41.1 GENERALIZED ANXIETY DISORDER: ICD-10-CM

## 2021-03-05 PROCEDURE — 99214 OFFICE O/P EST MOD 30 MIN: CPT | Performed by: NURSE PRACTITIONER

## 2021-03-05 RX ORDER — FLUOXETINE HYDROCHLORIDE 20 MG/1
20 CAPSULE ORAL DAILY
Qty: 30 CAPSULE | Refills: 5 | Status: SHIPPED | OUTPATIENT
Start: 2021-03-05 | End: 2021-09-10 | Stop reason: SDUPTHER

## 2021-03-05 NOTE — PROGRESS NOTES
Subjective   Chief Complaint   Patient presents with   • Depression     f/u      Dary Crawley is a 35 y.o. female here today for depression anxiety.  She is doing much better.  Prozac is greatly helped with her symptoms.    I have reviewed the following portions of the patient's history and confirmed they are accurate: allergies, current medications, past family history, past medical history, past social history, past surgical history and problem list    I have personally completed the patient's review of systems.    Review of Systems   Constitutional: Negative for activity change, appetite change, chills, diaphoresis, fatigue, fever, unexpected weight gain and unexpected weight loss.   HENT: Negative for ear discharge, ear pain, mouth sores, nosebleeds, sinus pressure, sneezing and sore throat.    Eyes: Negative for pain, discharge and itching.   Respiratory: Negative for cough, chest tightness, shortness of breath and wheezing.    Cardiovascular: Negative for chest pain, palpitations and leg swelling.   Gastrointestinal: Negative for abdominal pain, constipation, diarrhea, nausea and vomiting.   Endocrine: Negative for heat intolerance, polydipsia and polyphagia.   Genitourinary: Negative for dysuria, flank pain, frequency, hematuria and urgency.   Musculoskeletal: Negative for arthralgias, back pain, gait problem, joint swelling, myalgias, neck pain and neck stiffness.   Skin: Negative for color change, pallor and rash.   Allergic/Immunologic: Negative for immunocompromised state.   Neurological: Negative for seizures, speech difficulty, weakness and numbness.   Hematological: Negative for adenopathy.   Psychiatric/Behavioral: Positive for depressed mood and stress. Negative for agitation, decreased concentration, sleep disturbance and suicidal ideas. The patient is nervous/anxious.        Current Outpatient Medications on File Prior to Visit   Medication Sig   • cyclobenzaprine (FLEXERIL) 10 MG tablet Take 10  "mg by mouth Every 8 (Eight) Hours As Needed.   • etonogestrel-ethinyl estradiol (NUVARING) 0.12-0.015 MG/24HR vaginal ring Insert 1 each into the vagina Every 28 (Twenty-Eight) Days. Insert vaginally and leave in place for 3 consecutive weeks, then remove for 1 week.   • Magnesium 200 MG tablet Take 1 capsule by mouth Daily.   • multivitamin (MULTI-VITAMIN DAILY PO) Take 1 tablet by mouth Daily.   • Omega-3 Fatty Acids (fish oil) 1000 MG capsule capsule Take 1,000 mg by mouth Daily With Breakfast.   • propranolol (INDERAL) 20 MG tablet Take 0.5-1 tablets by mouth 3 (Three) Times a Day As Needed for anxiety     No current facility-administered medications on file prior to visit.       Objective   Vitals:    03/05/21 1334   BP: 114/72   Pulse: 65   Resp: 16   Temp: 97.1 °F (36.2 °C)   TempSrc: Temporal   SpO2: 99%   Weight: 59.9 kg (132 lb)   Height: 172.7 cm (67.99\")   PainSc: 0-No pain     Body mass index is 20.08 kg/m².    Physical Exam  Vitals reviewed.   Constitutional:       Appearance: Normal appearance. She is well-developed.   HENT:      Head: Normocephalic and atraumatic.      Nose: Nose normal.   Eyes:      General: Lids are normal.      Conjunctiva/sclera: Conjunctivae normal.      Pupils: Pupils are equal, round, and reactive to light.   Neck:      Thyroid: No thyromegaly.      Trachea: Trachea normal.   Cardiovascular:      Rate and Rhythm: Normal rate and regular rhythm.      Heart sounds: Normal heart sounds.   Pulmonary:      Effort: Pulmonary effort is normal. No respiratory distress.      Breath sounds: Normal breath sounds.   Skin:     General: Skin is warm and dry.   Neurological:      Mental Status: She is alert and oriented to person, place, and time.      GCS: GCS eye subscore is 4. GCS verbal subscore is 5. GCS motor subscore is 6.   Psychiatric:         Attention and Perception: Attention normal.         Mood and Affect: Mood is anxious and depressed.         Speech: Speech normal.         " Behavior: Behavior normal. Behavior is cooperative.         Thought Content: Thought content normal.         Assessment/Plan   Problem List Items Addressed This Visit     None      Visit Diagnoses     Moderate episode of recurrent major depressive disorder (CMS/HCC)        Relevant Medications    FLUoxetine (PROzac) 20 MG capsule    Generalized anxiety disorder        Relevant Medications    FLUoxetine (PROzac) 20 MG capsule             Current Outpatient Medications:   •  cyclobenzaprine (FLEXERIL) 10 MG tablet, Take 10 mg by mouth Every 8 (Eight) Hours As Needed., Disp: , Rfl:   •  etonogestrel-ethinyl estradiol (NUVARING) 0.12-0.015 MG/24HR vaginal ring, Insert 1 each into the vagina Every 28 (Twenty-Eight) Days. Insert vaginally and leave in place for 3 consecutive weeks, then remove for 1 week., Disp: 3 each, Rfl: 4  •  FLUoxetine (PROzac) 20 MG capsule, Take 1 capsule by mouth Daily., Disp: 30 capsule, Rfl: 5  •  Magnesium 200 MG tablet, Take 1 capsule by mouth Daily., Disp: , Rfl:   •  multivitamin (MULTI-VITAMIN DAILY PO), Take 1 tablet by mouth Daily., Disp: , Rfl:   •  Omega-3 Fatty Acids (fish oil) 1000 MG capsule capsule, Take 1,000 mg by mouth Daily With Breakfast., Disp: , Rfl:   •  propranolol (INDERAL) 20 MG tablet, Take 0.5-1 tablets by mouth 3 (Three) Times a Day As Needed for anxiety, Disp: 60 tablet, Rfl: 2       Plan of care reviewed with the patient at the conclusion of today's visit.  Education was provided regarding diagnosis, management, and any prescribed or recommended OTC medications.  Patient verbalized understanding of and agreement with management plan.     Return in about 6 months (around 9/5/2021), or if symptoms worsen or fail to improve.      AYANA Reyes    Please note that portions of this note were completed with a voice recognition program. Efforts were made to edit the dictations, but occasionally words are mistranscribed.

## 2021-08-04 RX ORDER — ETONOGESTREL AND ETHINYL ESTRADIOL 11.7; 2.7 MG/1; MG/1
1 INSERT, EXTENDED RELEASE VAGINAL
Qty: 3 EACH | Refills: 0 | Status: SHIPPED | OUTPATIENT
Start: 2021-08-04 | End: 2021-10-05 | Stop reason: SDUPTHER

## 2021-09-10 ENCOUNTER — OFFICE VISIT (OUTPATIENT)
Dept: INTERNAL MEDICINE | Facility: CLINIC | Age: 36
End: 2021-09-10

## 2021-09-10 VITALS
WEIGHT: 138 LBS | TEMPERATURE: 96.9 F | OXYGEN SATURATION: 98 % | BODY MASS INDEX: 20.92 KG/M2 | HEART RATE: 84 BPM | DIASTOLIC BLOOD PRESSURE: 68 MMHG | HEIGHT: 68 IN | SYSTOLIC BLOOD PRESSURE: 114 MMHG | RESPIRATION RATE: 16 BRPM

## 2021-09-10 DIAGNOSIS — F41.1 GENERALIZED ANXIETY DISORDER: ICD-10-CM

## 2021-09-10 DIAGNOSIS — F33.1 MODERATE EPISODE OF RECURRENT MAJOR DEPRESSIVE DISORDER (HCC): ICD-10-CM

## 2021-09-10 PROCEDURE — 99213 OFFICE O/P EST LOW 20 MIN: CPT | Performed by: NURSE PRACTITIONER

## 2021-09-10 RX ORDER — FLUOXETINE HYDROCHLORIDE 20 MG/1
20 CAPSULE ORAL DAILY
Qty: 30 CAPSULE | Refills: 5 | Status: SHIPPED | OUTPATIENT
Start: 2021-09-10 | End: 2022-03-11 | Stop reason: SDUPTHER

## 2021-09-10 RX ORDER — PROPRANOLOL HYDROCHLORIDE 20 MG/1
10-20 TABLET ORAL 3 TIMES DAILY PRN
Qty: 60 TABLET | Refills: 5 | Status: SHIPPED | OUTPATIENT
Start: 2021-09-10

## 2021-09-27 NOTE — PROGRESS NOTES
Subjective   Chief Complaint   Patient presents with   • Depression     f/u      Dary Crawley is a 36 y.o. female here today for depression and anxiety.     I have reviewed the following portions of the patient's history and confirmed they are accurate: allergies, current medications, past family history, past medical history, past social history, past surgical history and problem list    I have personally completed the patient's review of systems.    Review of Systems   Constitutional: Negative for activity change, appetite change, chills, diaphoresis, fatigue, fever, unexpected weight gain and unexpected weight loss.   HENT: Negative for ear discharge, ear pain, mouth sores, nosebleeds, sinus pressure, sneezing and sore throat.    Eyes: Negative for pain, discharge and itching.   Respiratory: Negative for cough, chest tightness, shortness of breath and wheezing.    Cardiovascular: Negative for chest pain, palpitations and leg swelling.   Gastrointestinal: Negative for abdominal pain, constipation, diarrhea, nausea and vomiting.   Endocrine: Negative for heat intolerance, polydipsia and polyphagia.   Genitourinary: Negative for dysuria, flank pain, frequency, hematuria and urgency.   Musculoskeletal: Negative for arthralgias, back pain, gait problem, joint swelling, myalgias, neck pain and neck stiffness.   Skin: Negative for color change, pallor and rash.   Allergic/Immunologic: Negative for immunocompromised state.   Neurological: Negative for seizures, speech difficulty, weakness and numbness.   Hematological: Negative for adenopathy.   Psychiatric/Behavioral: Negative for agitation, decreased concentration, sleep disturbance, suicidal ideas and depressed mood. The patient is not nervous/anxious.        Current Outpatient Medications on File Prior to Visit   Medication Sig   • etonogestrel-ethinyl estradiol (EluRyng) 0.12-0.015 MG/24HR vaginal ring Insert vaginally and leave in place for 3 consecutive weeks,  "then remove for 1 week.   • Magnesium 200 MG tablet Take 1 capsule by mouth Daily.   • multivitamin (MULTI-VITAMIN DAILY PO) Take 1 tablet by mouth Daily.   • Omega-3 Fatty Acids (fish oil) 1000 MG capsule capsule Take 1,000 mg by mouth Daily With Breakfast.     No current facility-administered medications on file prior to visit.       Objective   Vitals:    09/10/21 1330   BP: 114/68   Pulse: 84   Resp: 16   Temp: 96.9 °F (36.1 °C)   TempSrc: Temporal   SpO2: 98%   Weight: 62.6 kg (138 lb)   Height: 172.7 cm (67.99\")     Body mass index is 20.99 kg/m².    Physical Exam  Vitals reviewed.   Constitutional:       Appearance: Normal appearance. She is well-developed.   HENT:      Head: Normocephalic and atraumatic.      Nose: Nose normal.   Eyes:      General: Lids are normal.      Conjunctiva/sclera: Conjunctivae normal.      Pupils: Pupils are equal, round, and reactive to light.   Neck:      Thyroid: No thyromegaly.      Trachea: Trachea normal.   Pulmonary:      Effort: Pulmonary effort is normal. No respiratory distress.   Skin:     General: Skin is warm and dry.   Neurological:      Mental Status: She is alert and oriented to person, place, and time.      GCS: GCS eye subscore is 4. GCS verbal subscore is 5. GCS motor subscore is 6.   Psychiatric:         Attention and Perception: Attention normal.         Mood and Affect: Mood normal.         Speech: Speech normal.         Behavior: Behavior normal. Behavior is cooperative.         Assessment/Plan   Problem List Items Addressed This Visit     None      Visit Diagnoses     Moderate episode of recurrent major depressive disorder (CMS/HCC)      Chronic issue unstable requiring medication management and monitoring. Will eat well balanced diet, increase water intake, increase physical activity during the day, and get adequate rest. Discussed relaxation and coping skills and exercises.   Continue prozac.       Relevant Medications    FLUoxetine (PROzac) 20 MG capsule "    Generalized anxiety disorder      Chronic issue stable requiring medication management and monitoring. Will eat well balanced diet, increase water intake, increase physical activity during the day, and get adequate rest. Discussed relaxation and coping skills and exercises.   Continue prozac and propranolol      Relevant Medications    FLUoxetine (PROzac) 20 MG capsule    propranolol (INDERAL) 20 MG tablet             Current Outpatient Medications:   •  etonogestrel-ethinyl estradiol (EluRyng) 0.12-0.015 MG/24HR vaginal ring, Insert vaginally and leave in place for 3 consecutive weeks, then remove for 1 week., Disp: 3 each, Rfl: 0  •  FLUoxetine (PROzac) 20 MG capsule, Take 1 capsule by mouth Daily., Disp: 30 capsule, Rfl: 5  •  Magnesium 200 MG tablet, Take 1 capsule by mouth Daily., Disp: , Rfl:   •  multivitamin (MULTI-VITAMIN DAILY PO), Take 1 tablet by mouth Daily., Disp: , Rfl:   •  Omega-3 Fatty Acids (fish oil) 1000 MG capsule capsule, Take 1,000 mg by mouth Daily With Breakfast., Disp: , Rfl:   •  propranolol (INDERAL) 20 MG tablet, Take 0.5-1 tablets by mouth 3 (Three) Times a Day As Needed for anxiety, Disp: 60 tablet, Rfl: 5       Plan of care reviewed with the patient at the conclusion of today's visit.  Education was provided regarding diagnosis, management, and any prescribed or recommended OTC medications.  Patient verbalized understanding of and agreement with management plan.     Return in about 6 months (around 3/10/2022), or if symptoms worsen or fail to improve.      AYANA Reyes    Please note that portions of this note were completed with a voice recognition program. Efforts were made to edit the dictations, but occasionally words are mistranscribed.

## 2021-10-05 ENCOUNTER — OFFICE VISIT (OUTPATIENT)
Dept: OBSTETRICS AND GYNECOLOGY | Facility: CLINIC | Age: 36
End: 2021-10-05

## 2021-10-05 VITALS
WEIGHT: 141.8 LBS | SYSTOLIC BLOOD PRESSURE: 116 MMHG | BODY MASS INDEX: 21.49 KG/M2 | DIASTOLIC BLOOD PRESSURE: 70 MMHG | HEIGHT: 68 IN

## 2021-10-05 DIAGNOSIS — Z01.419 WELL WOMAN EXAM WITH ROUTINE GYNECOLOGICAL EXAM: Primary | ICD-10-CM

## 2021-10-05 PROCEDURE — 99395 PREV VISIT EST AGE 18-39: CPT | Performed by: OBSTETRICS & GYNECOLOGY

## 2021-10-05 RX ORDER — ETONOGESTREL AND ETHINYL ESTRADIOL 11.7; 2.7 MG/1; MG/1
1 INSERT, EXTENDED RELEASE VAGINAL
Qty: 3 EACH | Refills: 4 | Status: SHIPPED | OUTPATIENT
Start: 2021-10-05 | End: 2022-11-14 | Stop reason: SDUPTHER

## 2021-10-05 NOTE — PROGRESS NOTES
Subjective   Chief Complaint   Patient presents with   • Gynecologic Exam     annual.     Dary Crawley is a 36 y.o. year old  presenting to be seen for her annual exam.     SEXUAL Hx:  She is currently sexually active.  In the past year there there has been NO new sexual partners.    Condoms are never used.  She would not like to be screened for STD's at today's exam.  Current birth control method: NuvaRing.  She is happy with her current method of contraception and does not want to discuss alternative methods of contraception.  MENSTRUAL Hx:  Patient's last menstrual period was 2021 (exact date).  In the past 6 months her cycles have been regular, predictable and occur monthly.  Her menstrual flow is typically light.   Each month on average there are roughly 0 day(s) of very heavy flow.    Intermenstrual bleeding is absent.    Post-coital bleeding is absent.  Dysmenorrhea: none  PMS: none  Her cycles are not a source of concern for her that she wishes to discuss today.  HEALTH Hx:  She exercises regularly: yes.  She wears her seat belt: yes.  She has concerns about domestic violence: no.  OTHER THINGS SHE WANTS TO DISCUSS TODAY:  Nothing else    The following portions of the patient's history were reviewed and updated as appropriate:problem list, current medications, allergies, past family history, past medical history, past social history and past surgical history.    Social History    Tobacco Use      Smoking status: Never Smoker      Smokeless tobacco: Never Used    Review of Systems  Constitutional POS: nothing reported    NEG: anorexia or night sweats   Genitourinary POS: urgency    NEG: dysuria or hematuria      Gastointestinal POS: nothing reported    NEG: bloating, change in bowel habits, melena or reflux symptoms   Integument POS: nothing reported    NEG: moles that are changing in size, shape, color or rashes   Breast POS: nothing reported    NEG: persistent breast lump, skin dimpling or  "nipple discharge        Objective   /70   Ht 172.7 cm (68\")   Wt 64.3 kg (141 lb 12.8 oz)   LMP 09/22/2021 (Exact Date)   Breastfeeding No   BMI 21.56 kg/m²     General:  well developed; well nourished  no acute distress   Skin:  No suspicious lesions seen   Thyroid: normal to inspection and palpation   Breasts:  Examined in supine position  Symmetric without masses or skin dimpling  Nipples normal without inversion, lesions or discharge  There are no palpable axillary nodes   Abdomen: soft, non-tender; no masses  no umbilical or inguinal hernias are present  no hepato-splenomegaly   Pelvis: Clinical staff was present for exam  External genitalia:  normal appearance of the external genitalia including Bartholin's and Bluebell's glands.  :  urethral meatus normal;  Vaginal:  normal pink mucosa without prolapse or lesions.  Cervix:  normal appearance.  Uterus:  normal size, shape and consistency.  Adnexa:  normal bimanual exam of the adnexa.  Rectal:  digital rectal exam not performed; anus visually normal appearing.        Assessment   1. Normal GYN exam     Plan   Pap was not done today.  I explained to Dary that the recommendations for Pap smear interval in a low risk patient has lengthened to 5 years time with history of normal pap co test.  I told Dary she still needs to be seen in our office yearly for a full physical including breast and pelvic exam.  Reviewed the importance of exercise 2-3 times per week with at least 20-30 minutes of cardio  Start screening mammograms at age 2  Prescription(s) for Nuvaring were refilled today   The importance of keeping all planned follow-up and taking all medications as prescribed was emphasized.  Follow up for annual exam in one year    New Medications Ordered This Visit   Medications   • etonogestrel-ethinyl estradiol (EluRyng) 0.12-0.015 MG/24HR vaginal ring     Sig: Insert vaginally and leave in place for 3 consecutive weeks, then remove for 1 week.     " Dispense:  3 each     Refill:  4          This note was electronically signed.    Micki Rodriguez MD  October 5, 2021    Note: Speech recognition transcription software may have been used to create portions of this document.  An attempt at proofreading has been made but errors in transcription could still be present.

## 2021-11-19 RX ORDER — ETONOGESTREL AND ETHINYL ESTRADIOL 11.7; 2.7 MG/1; MG/1
1 INSERT, EXTENDED RELEASE VAGINAL
Qty: 3 EACH | Refills: 0 | OUTPATIENT
Start: 2021-11-19

## 2022-03-11 ENCOUNTER — OFFICE VISIT (OUTPATIENT)
Dept: INTERNAL MEDICINE | Facility: CLINIC | Age: 37
End: 2022-03-11

## 2022-03-11 VITALS
RESPIRATION RATE: 16 BRPM | HEART RATE: 97 BPM | DIASTOLIC BLOOD PRESSURE: 64 MMHG | BODY MASS INDEX: 22.13 KG/M2 | HEIGHT: 68 IN | SYSTOLIC BLOOD PRESSURE: 112 MMHG | WEIGHT: 146 LBS | OXYGEN SATURATION: 100 % | TEMPERATURE: 98.3 F

## 2022-03-11 DIAGNOSIS — Z13.0 SCREENING FOR BLOOD DISEASE: ICD-10-CM

## 2022-03-11 DIAGNOSIS — Z13.21 ENCOUNTER FOR VITAMIN DEFICIENCY SCREENING: ICD-10-CM

## 2022-03-11 DIAGNOSIS — Z13.1 SCREENING FOR DIABETES MELLITUS: ICD-10-CM

## 2022-03-11 DIAGNOSIS — Z13.29 THYROID DISORDER SCREENING: ICD-10-CM

## 2022-03-11 DIAGNOSIS — F33.1 MODERATE EPISODE OF RECURRENT MAJOR DEPRESSIVE DISORDER: Primary | ICD-10-CM

## 2022-03-11 DIAGNOSIS — Z11.59 ENCOUNTER FOR HEPATITIS C SCREENING TEST FOR LOW RISK PATIENT: ICD-10-CM

## 2022-03-11 DIAGNOSIS — Z13.220 LIPID SCREENING: ICD-10-CM

## 2022-03-11 DIAGNOSIS — E53.9 VITAMIN B DEFICIENCY: ICD-10-CM

## 2022-03-11 DIAGNOSIS — F41.1 GENERALIZED ANXIETY DISORDER: ICD-10-CM

## 2022-03-11 DIAGNOSIS — E55.9 VITAMIN D DEFICIENCY: ICD-10-CM

## 2022-03-11 PROCEDURE — 99214 OFFICE O/P EST MOD 30 MIN: CPT | Performed by: NURSE PRACTITIONER

## 2022-03-11 RX ORDER — FLUOXETINE HYDROCHLORIDE 20 MG/1
20 CAPSULE ORAL DAILY
Qty: 90 CAPSULE | Refills: 3 | Status: SHIPPED | OUTPATIENT
Start: 2022-03-11 | End: 2022-04-20

## 2022-03-11 NOTE — PROGRESS NOTES
Subjective   Chief Complaint   Patient presents with   • Depression      Dary Crawley is a 36 y.o. female here today for depression and anxiety.       I have reviewed the following portions of the patient's history and confirmed they are accurate: allergies, current medications, past family history, past medical history, past social history, past surgical history and problem list    I have personally completed the patient's review of systems.    Review of Systems   Constitutional: Negative for activity change, appetite change, chills, diaphoresis, fatigue, fever, unexpected weight gain and unexpected weight loss.   HENT: Negative for ear discharge, ear pain, mouth sores, nosebleeds, sinus pressure, sneezing and sore throat.    Eyes: Negative for pain, discharge and itching.   Respiratory: Negative for cough, chest tightness, shortness of breath and wheezing.    Cardiovascular: Negative for chest pain, palpitations and leg swelling.   Gastrointestinal: Negative for abdominal pain, constipation, diarrhea, nausea and vomiting.   Endocrine: Negative for heat intolerance, polydipsia and polyphagia.   Genitourinary: Negative for dysuria, flank pain, frequency, hematuria and urgency.   Musculoskeletal: Negative for arthralgias, back pain, gait problem, joint swelling, myalgias, neck pain and neck stiffness.   Skin: Negative for color change, pallor and rash.   Allergic/Immunologic: Negative for immunocompromised state.   Neurological: Negative for seizures, speech difficulty, weakness and numbness.   Hematological: Negative for adenopathy.   Psychiatric/Behavioral: Negative for agitation, decreased concentration, sleep disturbance, suicidal ideas and depressed mood. The patient is not nervous/anxious.        Current Outpatient Medications on File Prior to Visit   Medication Sig   • etonogestrel-ethinyl estradiol (EluRyng) 0.12-0.015 MG/24HR vaginal ring Insert vaginally and leave in place for 3 consecutive weeks, then  "remove for 1 week.   • Magnesium 200 MG tablet Take 1 capsule by mouth Daily.   • multivitamin (THERAGRAN) tablet tablet Take 1 tablet by mouth Daily.   • Omega-3 Fatty Acids (fish oil) 1000 MG capsule capsule Take 1,000 mg by mouth Daily With Breakfast.   • propranolol (INDERAL) 20 MG tablet Take 0.5-1 tablets by mouth 3 (Three) Times a Day As Needed for anxiety     No current facility-administered medications on file prior to visit.       Objective   Vitals:    03/11/22 1322   BP: 112/64   Pulse: 97   Resp: 16   Temp: 98.3 °F (36.8 °C)   TempSrc: Temporal   SpO2: 100%   Weight: 66.2 kg (146 lb)   Height: 172.7 cm (68\")     Body mass index is 22.2 kg/m².    Physical Exam  Vitals reviewed.   Constitutional:       Appearance: Normal appearance. She is well-developed.   HENT:      Head: Normocephalic and atraumatic.      Nose: Nose normal.   Eyes:      General: Lids are normal.      Conjunctiva/sclera: Conjunctivae normal.      Pupils: Pupils are equal, round, and reactive to light.   Neck:      Thyroid: No thyromegaly.      Trachea: Trachea normal.   Pulmonary:      Effort: Pulmonary effort is normal. No respiratory distress.   Skin:     General: Skin is warm and dry.   Neurological:      Mental Status: She is alert and oriented to person, place, and time.      GCS: GCS eye subscore is 4. GCS verbal subscore is 5. GCS motor subscore is 6.   Psychiatric:         Attention and Perception: Attention normal.         Mood and Affect: Mood normal.         Speech: Speech normal.         Behavior: Behavior normal. Behavior is cooperative.         Assessment/Plan   Problem List Items Addressed This Visit    None     Visit Diagnoses     Moderate episode of recurrent major depressive disorder (HCC)    -  Primary    Relevant Medications    FLUoxetine (PROzac) 20 MG capsule    Generalized anxiety disorder        Relevant Medications    FLUoxetine (PROzac) 20 MG capsule    Screening for blood disease        Relevant Orders    CBC " (No Diff) (Completed)    Comprehensive Metabolic Panel (Completed)    Lipid Panel (Completed)    Hemoglobin A1c (Completed)    TSH Rfx On Abnormal To Free T4 (Completed)    Vitamin B12 & Folate (Completed)    Vitamin D 25 Hydroxy (Completed)    Hepatitis C Antibody (Completed)    Vitamin D deficiency        Relevant Orders    Vitamin D 25 Hydroxy (Completed)    Vitamin B deficiency        Relevant Orders    Vitamin B12 & Folate (Completed)    Encounter for vitamin deficiency screening        Relevant Orders    Vitamin B12 & Folate (Completed)    Vitamin D 25 Hydroxy (Completed)    Screening for diabetes mellitus        Relevant Orders    Hemoglobin A1c (Completed)    Thyroid disorder screening        Relevant Orders    TSH Rfx On Abnormal To Free T4 (Completed)    Lipid screening        Relevant Orders    Lipid Panel (Completed)    Encounter for hepatitis C screening test for low risk patient        Relevant Orders    Hepatitis C Antibody (Completed)             Current Outpatient Medications:   •  etonogestrel-ethinyl estradiol (EluRyng) 0.12-0.015 MG/24HR vaginal ring, Insert vaginally and leave in place for 3 consecutive weeks, then remove for 1 week., Disp: 3 each, Rfl: 4  •  FLUoxetine (PROzac) 20 MG capsule, Take 1 capsule by mouth Daily., Disp: 90 capsule, Rfl: 3  •  Magnesium 200 MG tablet, Take 1 capsule by mouth Daily., Disp: , Rfl:   •  multivitamin (THERAGRAN) tablet tablet, Take 1 tablet by mouth Daily., Disp: , Rfl:   •  Omega-3 Fatty Acids (fish oil) 1000 MG capsule capsule, Take 1,000 mg by mouth Daily With Breakfast., Disp: , Rfl:   •  propranolol (INDERAL) 20 MG tablet, Take 0.5-1 tablets by mouth 3 (Three) Times a Day As Needed for anxiety, Disp: 60 tablet, Rfl: 5  •  amoxicillin-clavulanate (Augmentin) 875-125 MG per tablet, Take 1 tablet by mouth Every 12 (Twelve) Hours for 7 days., Disp: 14 tablet, Rfl: 0       Plan of care reviewed with the patient at the conclusion of today's visit.   Education was provided regarding diagnosis, management, and any prescribed or recommended OTC medications.  Patient verbalized understanding of and agreement with management plan.     Return in about 1 year (around 3/11/2023), or if symptoms worsen or fail to improve.      Marlee Rizvi, APRN

## 2022-03-14 ENCOUNTER — LAB (OUTPATIENT)
Dept: LAB | Facility: HOSPITAL | Age: 37
End: 2022-03-14

## 2022-03-14 DIAGNOSIS — E55.9 VITAMIN D DEFICIENCY: ICD-10-CM

## 2022-03-14 DIAGNOSIS — Z13.220 LIPID SCREENING: ICD-10-CM

## 2022-03-14 DIAGNOSIS — Z13.29 THYROID DISORDER SCREENING: ICD-10-CM

## 2022-03-14 DIAGNOSIS — Z11.59 ENCOUNTER FOR HEPATITIS C SCREENING TEST FOR LOW RISK PATIENT: ICD-10-CM

## 2022-03-14 DIAGNOSIS — Z13.21 ENCOUNTER FOR VITAMIN DEFICIENCY SCREENING: ICD-10-CM

## 2022-03-14 DIAGNOSIS — Z13.0 SCREENING FOR BLOOD DISEASE: ICD-10-CM

## 2022-03-14 DIAGNOSIS — Z13.1 SCREENING FOR DIABETES MELLITUS: ICD-10-CM

## 2022-03-14 DIAGNOSIS — E53.9 VITAMIN B DEFICIENCY: ICD-10-CM

## 2022-03-14 LAB
25(OH)D3 SERPL-MCNC: 44.5 NG/ML (ref 30–100)
ALBUMIN SERPL-MCNC: 4.3 G/DL (ref 3.5–5.2)
ALBUMIN/GLOB SERPL: 1.5 G/DL
ALP SERPL-CCNC: 43 U/L (ref 39–117)
ALT SERPL W P-5'-P-CCNC: 29 U/L (ref 1–33)
ANION GAP SERPL CALCULATED.3IONS-SCNC: 10 MMOL/L (ref 5–15)
AST SERPL-CCNC: 21 U/L (ref 1–32)
BILIRUB SERPL-MCNC: 0.6 MG/DL (ref 0–1.2)
BUN SERPL-MCNC: 17 MG/DL (ref 6–20)
BUN/CREAT SERPL: 20 (ref 7–25)
CALCIUM SPEC-SCNC: 9.1 MG/DL (ref 8.6–10.5)
CHLORIDE SERPL-SCNC: 102 MMOL/L (ref 98–107)
CHOLEST SERPL-MCNC: 224 MG/DL (ref 0–200)
CO2 SERPL-SCNC: 25 MMOL/L (ref 22–29)
CREAT SERPL-MCNC: 0.85 MG/DL (ref 0.57–1)
DEPRECATED RDW RBC AUTO: 38.1 FL (ref 37–54)
EGFRCR SERPLBLD CKD-EPI 2021: 90.1 ML/MIN/1.73
ERYTHROCYTE [DISTWIDTH] IN BLOOD BY AUTOMATED COUNT: 11.5 % (ref 12.3–15.4)
FOLATE SERPL-MCNC: >20 NG/ML (ref 4.78–24.2)
GLOBULIN UR ELPH-MCNC: 2.8 GM/DL
GLUCOSE SERPL-MCNC: 93 MG/DL (ref 65–99)
HBA1C MFR BLD: 4.9 % (ref 4.8–5.6)
HCT VFR BLD AUTO: 43 % (ref 34–46.6)
HCV AB SER DONR QL: NORMAL
HDLC SERPL-MCNC: 49 MG/DL (ref 40–60)
HGB BLD-MCNC: 14.7 G/DL (ref 12–15.9)
LDLC SERPL CALC-MCNC: 155 MG/DL (ref 0–100)
LDLC/HDLC SERPL: 3.12 {RATIO}
MCH RBC QN AUTO: 31.1 PG (ref 26.6–33)
MCHC RBC AUTO-ENTMCNC: 34.2 G/DL (ref 31.5–35.7)
MCV RBC AUTO: 90.9 FL (ref 79–97)
PLATELET # BLD AUTO: 238 10*3/MM3 (ref 140–450)
PMV BLD AUTO: 11.4 FL (ref 6–12)
POTASSIUM SERPL-SCNC: 4.1 MMOL/L (ref 3.5–5.2)
PROT SERPL-MCNC: 7.1 G/DL (ref 6–8.5)
RBC # BLD AUTO: 4.73 10*6/MM3 (ref 3.77–5.28)
SODIUM SERPL-SCNC: 137 MMOL/L (ref 136–145)
TRIGL SERPL-MCNC: 111 MG/DL (ref 0–150)
TSH SERPL DL<=0.05 MIU/L-ACNC: 1.06 UIU/ML (ref 0.27–4.2)
VIT B12 BLD-MCNC: 1622 PG/ML (ref 211–946)
VLDLC SERPL-MCNC: 20 MG/DL (ref 5–40)
WBC NRBC COR # BLD: 5.02 10*3/MM3 (ref 3.4–10.8)

## 2022-03-14 PROCEDURE — 86803 HEPATITIS C AB TEST: CPT

## 2022-03-14 PROCEDURE — 82746 ASSAY OF FOLIC ACID SERUM: CPT

## 2022-03-14 PROCEDURE — 80050 GENERAL HEALTH PANEL: CPT

## 2022-03-14 PROCEDURE — 82607 VITAMIN B-12: CPT

## 2022-03-14 PROCEDURE — 82306 VITAMIN D 25 HYDROXY: CPT

## 2022-03-14 PROCEDURE — 83036 HEMOGLOBIN GLYCOSYLATED A1C: CPT

## 2022-03-14 PROCEDURE — 80061 LIPID PANEL: CPT

## 2022-03-23 RX ORDER — AMOXICILLIN AND CLAVULANATE POTASSIUM 875; 125 MG/1; MG/1
1 TABLET, FILM COATED ORAL EVERY 12 HOURS
Qty: 14 TABLET | Refills: 0 | Status: SHIPPED | OUTPATIENT
Start: 2022-03-23 | End: 2022-03-30

## 2022-04-20 RX ORDER — FLUOXETINE 10 MG/1
10 CAPSULE ORAL DAILY
Qty: 30 CAPSULE | Refills: 0 | Status: SHIPPED | OUTPATIENT
Start: 2022-04-20 | End: 2022-05-13 | Stop reason: SDUPTHER

## 2022-05-13 RX ORDER — FLUOXETINE 10 MG/1
10 CAPSULE ORAL DAILY
Qty: 30 CAPSULE | Refills: 2 | Status: SHIPPED | OUTPATIENT
Start: 2022-05-13 | End: 2022-06-09

## 2022-05-31 RX ORDER — AZITHROMYCIN 250 MG/1
TABLET, FILM COATED ORAL
Qty: 6 TABLET | Refills: 0 | OUTPATIENT
Start: 2022-05-31 | End: 2022-09-08

## 2022-06-09 RX ORDER — FLUOXETINE HYDROCHLORIDE 20 MG/1
20 CAPSULE ORAL DAILY
Qty: 30 CAPSULE | Refills: 5 | Status: SHIPPED | OUTPATIENT
Start: 2022-06-09 | End: 2022-12-04 | Stop reason: SDUPTHER

## 2022-09-08 ENCOUNTER — APPOINTMENT (OUTPATIENT)
Dept: GENERAL RADIOLOGY | Facility: HOSPITAL | Age: 37
End: 2022-09-08

## 2022-09-08 ENCOUNTER — HOSPITAL ENCOUNTER (EMERGENCY)
Facility: HOSPITAL | Age: 37
Discharge: HOME | End: 2022-09-08
Attending: EMERGENCY MEDICINE | Admitting: EMERGENCY MEDICINE

## 2022-09-08 VITALS
BODY MASS INDEX: 21.98 KG/M2 | HEART RATE: 91 BPM | WEIGHT: 145 LBS | OXYGEN SATURATION: 100 % | HEIGHT: 68 IN | RESPIRATION RATE: 16 BRPM | TEMPERATURE: 98 F | DIASTOLIC BLOOD PRESSURE: 80 MMHG | SYSTOLIC BLOOD PRESSURE: 122 MMHG

## 2022-09-08 DIAGNOSIS — R07.81 PLEURODYNIA: Primary | ICD-10-CM

## 2022-09-08 LAB
ALBUMIN SERPL-MCNC: 3.8 G/DL (ref 3.5–5.2)
ALBUMIN/GLOB SERPL: 1.2 G/DL
ALP SERPL-CCNC: 45 U/L (ref 39–117)
ALT SERPL W P-5'-P-CCNC: 22 U/L (ref 1–33)
ANION GAP SERPL CALCULATED.3IONS-SCNC: 12 MMOL/L (ref 5–15)
AST SERPL-CCNC: 17 U/L (ref 1–32)
B-HCG UR QL: NEGATIVE
BASOPHILS # BLD AUTO: 0.04 10*3/MM3 (ref 0–0.2)
BASOPHILS NFR BLD AUTO: 0.4 % (ref 0–1.5)
BILIRUB SERPL-MCNC: 0.8 MG/DL (ref 0–1.2)
BUN SERPL-MCNC: 17 MG/DL (ref 6–20)
BUN/CREAT SERPL: 22.7 (ref 7–25)
CALCIUM SPEC-SCNC: 9 MG/DL (ref 8.6–10.5)
CHLORIDE SERPL-SCNC: 106 MMOL/L (ref 98–107)
CO2 SERPL-SCNC: 20 MMOL/L (ref 22–29)
CREAT SERPL-MCNC: 0.75 MG/DL (ref 0.57–1)
D DIMER PPP FEU-MCNC: 0.4 MCGFEU/ML (ref 0.01–0.5)
DEPRECATED RDW RBC AUTO: 35.8 FL (ref 37–54)
EGFRCR SERPLBLD CKD-EPI 2021: 104 ML/MIN/1.73
EOSINOPHIL # BLD AUTO: 0.04 10*3/MM3 (ref 0–0.4)
EOSINOPHIL NFR BLD AUTO: 0.4 % (ref 0.3–6.2)
ERYTHROCYTE [DISTWIDTH] IN BLOOD BY AUTOMATED COUNT: 11.5 % (ref 12.3–15.4)
EXPIRATION DATE: NORMAL
FLUAV RNA RESP QL NAA+PROBE: NOT DETECTED
FLUBV RNA RESP QL NAA+PROBE: NOT DETECTED
GLOBULIN UR ELPH-MCNC: 3.2 GM/DL
GLUCOSE SERPL-MCNC: 99 MG/DL (ref 65–99)
HCT VFR BLD AUTO: 37.6 % (ref 34–46.6)
HGB BLD-MCNC: 13.4 G/DL (ref 12–15.9)
HOLD SPECIMEN: NORMAL
IMM GRANULOCYTES # BLD AUTO: 0.03 10*3/MM3 (ref 0–0.05)
IMM GRANULOCYTES NFR BLD AUTO: 0.3 % (ref 0–0.5)
INTERNAL NEGATIVE CONTROL: NEGATIVE
INTERNAL POSITIVE CONTROL: POSITIVE
LYMPHOCYTES # BLD AUTO: 0.91 10*3/MM3 (ref 0.7–3.1)
LYMPHOCYTES NFR BLD AUTO: 8.8 % (ref 19.6–45.3)
Lab: NORMAL
MCH RBC QN AUTO: 30.6 PG (ref 26.6–33)
MCHC RBC AUTO-ENTMCNC: 35.6 G/DL (ref 31.5–35.7)
MCV RBC AUTO: 85.8 FL (ref 79–97)
MONOCYTES # BLD AUTO: 0.63 10*3/MM3 (ref 0.1–0.9)
MONOCYTES NFR BLD AUTO: 6.1 % (ref 5–12)
NEUTROPHILS NFR BLD AUTO: 8.64 10*3/MM3 (ref 1.7–7)
NEUTROPHILS NFR BLD AUTO: 84 % (ref 42.7–76)
NRBC BLD AUTO-RTO: 0 /100 WBC (ref 0–0.2)
NT-PROBNP SERPL-MCNC: 191.5 PG/ML (ref 0–450)
PLATELET # BLD AUTO: 174 10*3/MM3 (ref 140–450)
PMV BLD AUTO: 12 FL (ref 6–12)
POTASSIUM SERPL-SCNC: 4.2 MMOL/L (ref 3.5–5.2)
PROT SERPL-MCNC: 7 G/DL (ref 6–8.5)
QT INTERVAL: 384 MS
QTC INTERVAL: 437 MS
RBC # BLD AUTO: 4.38 10*6/MM3 (ref 3.77–5.28)
SARS-COV-2 RNA RESP QL NAA+PROBE: NOT DETECTED
SODIUM SERPL-SCNC: 138 MMOL/L (ref 136–145)
TROPONIN T SERPL-MCNC: <0.01 NG/ML (ref 0–0.03)
WBC NRBC COR # BLD: 10.29 10*3/MM3 (ref 3.4–10.8)
WHOLE BLOOD HOLD COAG: NORMAL
WHOLE BLOOD HOLD SPECIMEN: NORMAL

## 2022-09-08 PROCEDURE — 99284 EMERGENCY DEPT VISIT MOD MDM: CPT

## 2022-09-08 PROCEDURE — 71045 X-RAY EXAM CHEST 1 VIEW: CPT

## 2022-09-08 PROCEDURE — 87636 SARSCOV2 & INF A&B AMP PRB: CPT | Performed by: EMERGENCY MEDICINE

## 2022-09-08 PROCEDURE — 96365 THER/PROPH/DIAG IV INF INIT: CPT

## 2022-09-08 PROCEDURE — 93005 ELECTROCARDIOGRAM TRACING: CPT | Performed by: EMERGENCY MEDICINE

## 2022-09-08 PROCEDURE — 25010000002 DEXAMETHASONE SODIUM PHOSPHATE 100 MG/10ML SOLUTION: Performed by: EMERGENCY MEDICINE

## 2022-09-08 PROCEDURE — 85379 FIBRIN DEGRADATION QUANT: CPT | Performed by: EMERGENCY MEDICINE

## 2022-09-08 PROCEDURE — 80053 COMPREHEN METABOLIC PANEL: CPT | Performed by: EMERGENCY MEDICINE

## 2022-09-08 PROCEDURE — 84484 ASSAY OF TROPONIN QUANT: CPT | Performed by: EMERGENCY MEDICINE

## 2022-09-08 PROCEDURE — 81025 URINE PREGNANCY TEST: CPT | Performed by: EMERGENCY MEDICINE

## 2022-09-08 PROCEDURE — 93005 ELECTROCARDIOGRAM TRACING: CPT

## 2022-09-08 PROCEDURE — 83880 ASSAY OF NATRIURETIC PEPTIDE: CPT | Performed by: EMERGENCY MEDICINE

## 2022-09-08 PROCEDURE — 85025 COMPLETE CBC W/AUTO DIFF WBC: CPT | Performed by: EMERGENCY MEDICINE

## 2022-09-08 RX ORDER — SODIUM CHLORIDE 0.9 % (FLUSH) 0.9 %
10 SYRINGE (ML) INJECTION AS NEEDED
Status: DISCONTINUED | OUTPATIENT
Start: 2022-09-08 | End: 2022-09-08 | Stop reason: HOSPADM

## 2022-09-08 RX ADMIN — DEXAMETHASONE SODIUM PHOSPHATE 10 MG: 10 INJECTION, SOLUTION INTRAMUSCULAR; INTRAVENOUS at 12:17

## 2022-09-08 NOTE — ED PROVIDER NOTES
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  Her primary care is [facility].  She is accompanied by her mother.  She is generally active and exercises by walking her dog.  No history of cardiac or lung issues.    She presents to the emergency room today with sudden onset of left-sided and substernal chest pain that began yesterday.  It was went from her chest into her back and was much worse with deep breathing.  It persisted all night she slept poorly and she took 800 mg of Advil this morning and feels better.  She comes to the emergency department for further evaluation.    She is never had anything like this in the past.  She is not particularly dyspneic with that movement seems to exacerbate it.  She has pain in her left shoulder area.  She has had no trauma or no change in her usual activities to precipitate this.  She did have near syncope and palpitations a few years ago and had a event monitor and echo by Dr. Shelton and I have reviewed those and they were reassuring.  No history of blood clots.  There is a family history of pleurisy interestingly.    She has had no prodromal illness she has not had fevers or chills runny nose sore throat or cough.  Bowel movements and urine to been normal as had her menses and she has a NuvaRing for birth control.  She has had no peripheral edema.  No history of GI issues.        All other systems are reviewed and are negative except as noted above.          Review of Systems   All other systems reviewed and are negative.      Past Medical History:   Diagnosis Date   • Anxiety    • Headache    • Injury of neck    • Ovarian cyst      Reading physician: Alejo Shelton MD Ordering physician: Alejo Shelton MD Study date: 20       Patient Information    Patient Name   Dary Crawley MRN   [id number] Legal Sex   Female  (Age)   1985 (37 y.o.)         [employer] PACS Images     Show images for  Adult Transthoracic Echo Complete W/ Cont if Necessary Per Protocol      Sedation Narrator Report    Sedation Narrator Report       Interpretation Summary    · Estimated EF appears to be in the range of 56 - 60%.  · Left ventricular systolic function is normal.  · No significant structural or functional valvular disease.      No Known Allergies    Past Surgical History:   Procedure Laterality Date   •  SECTION  2011   • WISDOM TOOTH EXTRACTION         Family History   Problem Relation Age of Onset   • Breast cancer Maternal Aunt 50   • Arthritis Mother    • Hypothyroidism Father    • Hypertension Father    • No Known Problems Sister    • Ovarian cancer Neg Hx    • Colon cancer Neg Hx        Social History     Socioeconomic History   • Marital status:    Tobacco Use   • Smoking status: Never Smoker   • Smokeless tobacco: Never Used   Substance and Sexual Activity   • Alcohol use: Never   • Drug use: Never   • Sexual activity: Yes     Partners: Male     Birth control/protection: Other     Comment: Nuvaring           Objective   Physical Exam  Vitals and nursing note reviewed.   Constitutional:       Appearance: She is normal weight.      Comments: He is alert and oriented no acute distress vital signs noted oxygen saturation is 98% on room air.   HENT:      Head: Normocephalic and atraumatic.      Right Ear: External ear normal.      Left Ear: External ear normal.      Nose: Nose normal.      Mouth/Throat:      Mouth: Mucous membranes are moist.      Pharynx: Oropharynx is clear.   Eyes:      Extraocular Movements: Extraocular movements intact.      Conjunctiva/sclera: Conjunctivae normal.      Pupils: Pupils are equal, round, and reactive to light.   Neck:      Vascular: No carotid bruit.      Comments: Axilla without masses.    She is tender to palpation in the left rhomboid and trapezius area without mass or rash.  Chest mildly tender to palpation anteriorly.  Cardiovascular:      Rate and  Rhythm: Normal rate and regular rhythm.      Pulses: Normal pulses.      Heart sounds: Normal heart sounds.      Comments: Specifically I hear no rubs and movement leaning forward and leaning backward does not seem to change her pain.  Pulmonary:      Effort: Pulmonary effort is normal.      Breath sounds: Normal breath sounds.   Abdominal:      General: Abdomen is flat. Bowel sounds are normal. There is no distension.      Palpations: Abdomen is soft. There is no mass.      Tenderness: There is no abdominal tenderness.   Musculoskeletal:         General: No swelling or tenderness. Normal range of motion.      Cervical back: Normal range of motion and neck supple. No rigidity or tenderness.      Comments: Equal pulses without edema, synovitis, rash, or venous cords.  Pulses are 4/4 in all 4 extremities.   Lymphadenopathy:      Cervical: No cervical adenopathy.   Skin:     General: Skin is warm and dry.      Capillary Refill: Capillary refill takes less than 2 seconds.   Neurological:      General: No focal deficit present.      Mental Status: She is alert and oriented to person, place, and time.      Comments: Face is symmetric, voice is strong, tongue is midline.  Vision, hearing, and speech are preserved.  No focal weakness.         Procedures           ED Course           Recent Results (from the past 24 hour(s))   ECG 12 Lead    Collection Time: 09/08/22 10:25 AM   Result Value Ref Range    QT Interval 384 ms    QTC Interval 437 ms   Comprehensive Metabolic Panel    Collection Time: 09/08/22 10:31 AM    Specimen: Blood   Result Value Ref Range    Glucose 99 65 - 99 mg/dL    BUN 17 6 - 20 mg/dL    Creatinine 0.75 0.57 - 1.00 mg/dL    Sodium 138 136 - 145 mmol/L    Potassium 4.2 3.5 - 5.2 mmol/L    Chloride 106 98 - 107 mmol/L    CO2 20.0 (L) 22.0 - 29.0 mmol/L    Calcium 9.0 8.6 - 10.5 mg/dL    Total Protein 7.0 6.0 - 8.5 g/dL    Albumin 3.80 3.50 - 5.20 g/dL    ALT (SGPT) 22 1 - 33 U/L    AST (SGOT) 17 1 - 32  U/L    Alkaline Phosphatase 45 39 - 117 U/L    Total Bilirubin 0.8 0.0 - 1.2 mg/dL    Globulin 3.2 gm/dL    A/G Ratio 1.2 g/dL    BUN/Creatinine Ratio 22.7 7.0 - 25.0    Anion Gap 12.0 5.0 - 15.0 mmol/L    eGFR 104.0 >60.0 mL/min/1.73   BNP    Collection Time: 09/08/22 10:31 AM    Specimen: Blood   Result Value Ref Range    proBNP 191.5 0.0 - 450.0 pg/mL   Troponin    Collection Time: 09/08/22 10:31 AM    Specimen: Blood   Result Value Ref Range    Troponin T <0.010 0.000 - 0.030 ng/mL   Green Top (Gel)    Collection Time: 09/08/22 10:31 AM   Result Value Ref Range    Extra Tube Hold for add-ons.    Lavender Top    Collection Time: 09/08/22 10:31 AM   Result Value Ref Range    Extra Tube hold for add-on    Gold Top - SST    Collection Time: 09/08/22 10:31 AM   Result Value Ref Range    Extra Tube Hold for add-ons.    Gray Top    Collection Time: 09/08/22 10:31 AM   Result Value Ref Range    Extra Tube Hold for add-ons.    Light Blue Top    Collection Time: 09/08/22 10:31 AM   Result Value Ref Range    Extra Tube Hold for add-ons.    CBC Auto Differential    Collection Time: 09/08/22 10:31 AM    Specimen: Blood   Result Value Ref Range    WBC 10.29 3.40 - 10.80 10*3/mm3    RBC 4.38 3.77 - 5.28 10*6/mm3    Hemoglobin 13.4 12.0 - 15.9 g/dL    Hematocrit 37.6 34.0 - 46.6 %    MCV 85.8 79.0 - 97.0 fL    MCH 30.6 26.6 - 33.0 pg    MCHC 35.6 31.5 - 35.7 g/dL    RDW 11.5 (L) 12.3 - 15.4 %    RDW-SD 35.8 (L) 37.0 - 54.0 fl    MPV 12.0 6.0 - 12.0 fL    Platelets 174 140 - 450 10*3/mm3    Neutrophil % 84.0 (H) 42.7 - 76.0 %    Lymphocyte % 8.8 (L) 19.6 - 45.3 %    Monocyte % 6.1 5.0 - 12.0 %    Eosinophil % 0.4 0.3 - 6.2 %    Basophil % 0.4 0.0 - 1.5 %    Immature Grans % 0.3 0.0 - 0.5 %    Neutrophils, Absolute 8.64 (H) 1.70 - 7.00 10*3/mm3    Lymphocytes, Absolute 0.91 0.70 - 3.10 10*3/mm3    Monocytes, Absolute 0.63 0.10 - 0.90 10*3/mm3    Eosinophils, Absolute 0.04 0.00 - 0.40 10*3/mm3    Basophils, Absolute 0.04 0.00 -  0.20 10*3/mm3    Immature Grans, Absolute 0.03 0.00 - 0.05 10*3/mm3    nRBC 0.0 0.0 - 0.2 /100 WBC   D-dimer, Quantitative    Collection Time: 09/08/22 10:31 AM    Specimen: Blood   Result Value Ref Range    D-Dimer, Quantitative 0.40 0.01 - 0.50 MCGFEU/mL   COVID-19 and FLU A/B PCR - Swab, Nasopharynx    Collection Time: 09/08/22 11:16 AM    Specimen: Nasopharynx; Swab   Result Value Ref Range    COVID19 Not Detected Not Detected - Ref. Range    Influenza A PCR Not Detected Not Detected    Influenza B PCR Not Detected Not Detected   POC Urine Pregnancy    Collection Time: 09/08/22 11:17 AM    Specimen: Urine   Result Value Ref Range    HCG, Urine, QL Negative Negative    Lot Number [id number]     Internal Positive Control Positive Positive, Passed    Internal Negative Control Negative Negative, Passed    Expiration Date 9/308/23      Note: In addition to lab results from this visit, the labs listed above may include labs taken at another facility or during a different encounter within the last 24 hours. Please correlate lab times with ED admission and discharge times for further clarification of the services performed during this visit.    XR Chest 1 View   Final Result   No acute cardiopulmonary findings.       This report was finalized on 9/8/2022 10:53 AM by Andreas Sandhu MD.            Vitals:    09/08/22 1130 09/08/22 1200 09/08/22 1201 09/08/22 1230   BP: 111/78 115/77  122/80   BP Location:       Patient Position:       Pulse: 84 91 84 91   Resp:       Temp:       TempSrc:       SpO2: 99% 100% 100% 100%   Weight:       Height:         Medications   dexamethasone (DECADRON) IVPB 10 mg (0 mg Intravenous Stopped 9/8/22 1310)     ECG/EMG Results (last 24 hours)     Procedure Component Value Units Date/Time    ECG 12 Lead [[id number]] Collected: 09/08/22 1025     Updated: 09/08/22 1151     QT Interval 384 ms      QTC Interval 437 ms     Narrative:      Test Reason : SOA Protocol  Blood Pressure :   */*    mmHG  Vent. Rate :  78 BPM     Atrial Rate :  78 BPM     P-R Int : 108 ms          QRS Dur :  74 ms      QT Int : 384 ms       P-R-T Axes :  68  62  38 degrees     QTc Int : 437 ms    Sinus rhythm with short FL  Otherwise normal ECG  No previous ECGs available  Confirmed by MADHU VANEGAS MD ([id number]) on 9/8/2022 11:51:25 AM    Referred By: RAISSA           Confirmed By: MADHU VANEGAS MD        ECG 12 Lead   Final Result   Test Reason : SOA Protocol   Blood Pressure :   */*   mmHG   Vent. Rate :  78 BPM     Atrial Rate :  78 BPM      P-R Int : 108 ms          QRS Dur :  74 ms       QT Int : 384 ms       P-R-T Axes :  68  62  38 degrees      QTc Int : 437 ms      Sinus rhythm with short FL   Otherwise normal ECG   No previous ECGs available   Confirmed by MADHU VANEGAS MD ([id number]) on 9/8/2022 11:51:25 AM      Referred By: RAISSA           Confirmed By: MADHU VANEGAS MD                                        MDM  Number of Diagnoses or Management Options  Pleurodynia  Diagnosis management comments:       I reviewed all available studies at the bedside with the patient and her family.  Her labs are bland.  D-dimer and cardiac markers and chest x-ray are all reassuring.  I do not hear a pericardial rub.    I suspect the patient has pleurodynia.  This point I have given her 1 dose of steroids and I plan to treat her with nonsteroidals until her symptoms resolve and I have asked her to follow-up with her primary care for recheck.  Discussed that if she has recurrent issues she may need something like colchicine.  She may need further testing as well.    She will return to the ED if worse in any way.    All are agreeable with the plan       Amount and/or Complexity of Data Reviewed  Clinical lab tests: reviewed  Tests in the radiology section of CPT®: reviewed  Tests in the medicine section of CPT®: reviewed        Final diagnoses:   Pleurodynia       ED Disposition  ED Disposition     ED Disposition   Discharge    Condition    Stable    Comment   --             Janelle Espino, Marlee, APRN  [address] [id number] [phone]    In 1 week           Medication List      Stop    azithromycin 250 MG tablet  Commonly known as: Zithromax Z-Hola             Dennis Carpenter MD  09/08/22 2116

## 2022-09-08 NOTE — DISCHARGE INSTRUCTIONS
Please take 400 mg of Advil 3 times a day or 250 mg of naproxen twice a day until your symptoms resolve.  Go ahead and use over-the-counter Prilosec to protect her stomach.

## 2022-11-14 ENCOUNTER — OFFICE VISIT (OUTPATIENT)
Dept: OBSTETRICS AND GYNECOLOGY | Facility: CLINIC | Age: 37
End: 2022-11-14

## 2022-11-14 VITALS — DIASTOLIC BLOOD PRESSURE: 78 MMHG | SYSTOLIC BLOOD PRESSURE: 118 MMHG

## 2022-11-14 DIAGNOSIS — Z01.419 WELL WOMAN EXAM WITH ROUTINE GYNECOLOGICAL EXAM: Primary | ICD-10-CM

## 2022-11-14 PROCEDURE — 99395 PREV VISIT EST AGE 18-39: CPT | Performed by: OBSTETRICS & GYNECOLOGY

## 2022-11-14 RX ORDER — ETONOGESTREL AND ETHINYL ESTRADIOL 11.7; 2.7 MG/1; MG/1
1 INSERT, EXTENDED RELEASE VAGINAL
Qty: 3 EACH | Refills: 4 | Status: SHIPPED | OUTPATIENT
Start: 2022-11-14

## 2022-11-14 NOTE — PROGRESS NOTES
Subjective   Chief Complaint   Patient presents with   • Gynecologic Exam     No c/o     Dary Crawley is a 37 y.o. year old  presenting to be seen for her annual exam.     SEXUAL Hx:  She is currently sexually active.  In the past year there there has been NO new sexual partners.    Condoms are never used.  She would not like to be screened for STD's at today's exam.  Current birth control method: NuvaRing.  She is happy with her current method of contraception and does not want to discuss alternative methods of contraception.  MENSTRUAL Hx:  LMP one month ago   In the past 6 months her cycles have been regular, predictable and occur monthly.  Her menstrual flow is typically normal.   Each month on average there are roughly 0 day(s) of very heavy flow.    Intermenstrual bleeding is absent.    Post-coital bleeding is absent.  Dysmenorrhea: none  PMS: none  Her cycles are not a source of concern for her that she wishes to discuss today.  HEALTH Hx:  She exercises regularly: yes.  She wears her seat belt: yes.  She has concerns about domestic violence: no.  OTHER THINGS SHE WANTS TO DISCUSS TODAY:  Nothing else    The following portions of the patient's history were reviewed and updated as appropriate:problem list, current medications, allergies, past family history, past medical history, past social history and past surgical history.    Social History    Tobacco Use      Smoking status: Never      Smokeless tobacco: Never    Review of Systems  Constitutional POS: nothing reported    NEG: anorexia or night sweats   Genitourinary POS: urinary urgency     NEG: dysuria or hematuria      Gastointestinal POS: nothing reported    NEG: bloating, change in bowel habits, melena or reflux symptoms   Integument POS: nothing reported    NEG: moles that are changing in size, shape, color or rashes   Breast POS: nothing reported    NEG: persistent breast lump, skin dimpling or nipple discharge        Objective   /78  (BP Location: Left arm, Patient Position: Sitting, Cuff Size: Adult)     General:  well developed; well nourished  no acute distress   Skin:  No suspicious lesions seen   Thyroid: normal to inspection and palpation   Breasts:  Examined in supine position  Symmetric without masses or skin dimpling  Nipples normal without inversion, lesions or discharge  There are no palpable axillary nodes   Abdomen: soft, non-tender; no masses  no umbilical or inguinal hernias are present  no hepato-splenomegaly   Pelvis: Clinical staff was present for exam  External genitalia:  normal appearance of the external genitalia including Bartholin's and [place]'s glands.  :  urethral meatus normal;  Vaginal:  normal pink mucosa without prolapse or lesions.  Cervix:  normal appearance.  Uterus:  normal size, shape and consistency.  Adnexa:  normal bimanual exam of the adnexa.  Rectal:  digital rectal exam not performed; anus visually normal appearing.        Assessment   1. Normal GYN exam     Plan   Pap was not done today.  I explained to Dary that the recommendations for Pap smear interval in a low risk patient has lengthened to 3 years time.  I told Dary she still needs to be seen in our office yearly for a full physical including breast and pelvic exam.  2. Start yearly mammograms at age 40 and colon cancer screening at age 45  3. Reviewed the importance of exercise 2-3 times per week with at least 20-30 minutes of cardio  4. She is getting her flu vaccine tomorrow at work  5. Prescription(s) for Nuvaring were refilled today   6. The importance of keeping all planned follow-up and taking all medications as prescribed was emphasized.  7. Follow up for annual exam in ~ one year    New Medications Ordered This Visit   Medications   • etonogestrel-ethinyl estradiol (EluRyng) 0.12-0.015 MG/24HR vaginal ring     Sig: Insert vaginally and leave in place for 3 consecutive weeks, then remove for 1 week.     Dispense:  3 each     Refill:  4           This note was electronically signed.    Micki Rodriguez MD  November 14, 2022

## 2022-12-05 RX ORDER — FLUOXETINE HYDROCHLORIDE 20 MG/1
20 CAPSULE ORAL DAILY
Qty: 30 CAPSULE | Refills: 5 | Status: SHIPPED | OUTPATIENT
Start: 2022-12-05

## 2022-12-14 DIAGNOSIS — J01.31 ACUTE RECURRENT SPHENOIDAL SINUSITIS: Primary | ICD-10-CM

## 2022-12-14 RX ORDER — AMOXICILLIN AND CLAVULANATE POTASSIUM 875; 125 MG/1; MG/1
1 TABLET, FILM COATED ORAL EVERY 12 HOURS
Qty: 20 TABLET | Refills: 0 | Status: SHIPPED | OUTPATIENT
Start: 2022-12-14 | End: 2022-12-24

## 2023-01-24 ENCOUNTER — HOSPITAL ENCOUNTER (OUTPATIENT)
Dept: GENERAL RADIOLOGY | Facility: HOSPITAL | Age: 38
Discharge: HOME | End: 2023-01-24
Admitting: NURSE PRACTITIONER
Payer: COMMERCIAL

## 2023-01-24 DIAGNOSIS — M79.674 GREAT TOE PAIN, RIGHT: ICD-10-CM

## 2023-01-24 DIAGNOSIS — M79.674 GREAT TOE PAIN, RIGHT: Primary | ICD-10-CM

## 2023-01-24 PROCEDURE — 73630 X-RAY EXAM OF FOOT: CPT

## 2023-06-07 DIAGNOSIS — Z13.29 THYROID DISORDER SCREENING: ICD-10-CM

## 2023-06-07 DIAGNOSIS — Z13.21 ENCOUNTER FOR VITAMIN DEFICIENCY SCREENING: ICD-10-CM

## 2023-06-07 DIAGNOSIS — E55.9 VITAMIN D DEFICIENCY: ICD-10-CM

## 2023-06-07 DIAGNOSIS — Z13.1 SCREENING FOR DIABETES MELLITUS: ICD-10-CM

## 2023-06-07 DIAGNOSIS — Z13.0 SCREENING FOR BLOOD DISEASE: Primary | ICD-10-CM

## 2023-06-07 DIAGNOSIS — Z00.00 WELLNESS EXAMINATION: ICD-10-CM

## 2023-06-07 DIAGNOSIS — Z13.220 LIPID SCREENING: ICD-10-CM

## 2023-06-12 ENCOUNTER — LAB (OUTPATIENT)
Dept: LAB | Facility: HOSPITAL | Age: 38
End: 2023-06-12
Payer: COMMERCIAL

## 2023-06-12 DIAGNOSIS — E55.9 VITAMIN D DEFICIENCY: ICD-10-CM

## 2023-06-12 DIAGNOSIS — Z13.220 LIPID SCREENING: ICD-10-CM

## 2023-06-12 DIAGNOSIS — Z13.21 ENCOUNTER FOR VITAMIN DEFICIENCY SCREENING: ICD-10-CM

## 2023-06-12 DIAGNOSIS — Z13.29 THYROID DISORDER SCREENING: ICD-10-CM

## 2023-06-12 DIAGNOSIS — Z00.00 WELLNESS EXAMINATION: ICD-10-CM

## 2023-06-12 DIAGNOSIS — Z13.1 SCREENING FOR DIABETES MELLITUS: ICD-10-CM

## 2023-06-12 DIAGNOSIS — Z13.0 SCREENING FOR BLOOD DISEASE: ICD-10-CM

## 2023-06-12 LAB
25(OH)D3 SERPL-MCNC: 42.5 NG/ML (ref 30–100)
ALBUMIN SERPL-MCNC: 4.2 G/DL (ref 3.5–5.2)
ALBUMIN/GLOB SERPL: 1.4 G/DL
ALP SERPL-CCNC: 44 U/L (ref 39–117)
ALT SERPL W P-5'-P-CCNC: 22 U/L (ref 1–33)
ANION GAP SERPL CALCULATED.3IONS-SCNC: 9.8 MMOL/L (ref 5–15)
AST SERPL-CCNC: 21 U/L (ref 1–32)
BILIRUB SERPL-MCNC: 0.6 MG/DL (ref 0–1.2)
BUN SERPL-MCNC: 12 MG/DL (ref 6–20)
BUN/CREAT SERPL: 14.6 (ref 7–25)
CALCIUM SPEC-SCNC: 9.3 MG/DL (ref 8.6–10.5)
CHLORIDE SERPL-SCNC: 108 MMOL/L (ref 98–107)
CHOLEST SERPL-MCNC: 176 MG/DL (ref 0–200)
CO2 SERPL-SCNC: 21.2 MMOL/L (ref 22–29)
CREAT SERPL-MCNC: 0.82 MG/DL (ref 0.57–1)
DEPRECATED RDW RBC AUTO: 36.8 FL (ref 37–54)
EGFRCR SERPLBLD CKD-EPI 2021: 92.9 ML/MIN/1.73
ERYTHROCYTE [DISTWIDTH] IN BLOOD BY AUTOMATED COUNT: 11.5 % (ref 12.3–15.4)
FOLATE SERPL-MCNC: >20 NG/ML (ref 4.78–24.2)
GLOBULIN UR ELPH-MCNC: 2.9 GM/DL
GLUCOSE SERPL-MCNC: 106 MG/DL (ref 65–99)
HBA1C MFR BLD: 5.1 % (ref 4.8–5.6)
HCT VFR BLD AUTO: 40.4 % (ref 34–46.6)
HDLC SERPL-MCNC: 42 MG/DL (ref 40–60)
HGB BLD-MCNC: 13.7 G/DL (ref 12–15.9)
LDLC SERPL CALC-MCNC: 113 MG/DL (ref 0–100)
LDLC/HDLC SERPL: 2.65 {RATIO}
MCH RBC QN AUTO: 29.6 PG (ref 26.6–33)
MCHC RBC AUTO-ENTMCNC: 33.9 G/DL (ref 31.5–35.7)
MCV RBC AUTO: 87.3 FL (ref 79–97)
PLATELET # BLD AUTO: 215 10*3/MM3 (ref 140–450)
PMV BLD AUTO: 12 FL (ref 6–12)
POTASSIUM SERPL-SCNC: 4.4 MMOL/L (ref 3.5–5.2)
PROT SERPL-MCNC: 7.1 G/DL (ref 6–8.5)
RBC # BLD AUTO: 4.63 10*6/MM3 (ref 3.77–5.28)
SODIUM SERPL-SCNC: 139 MMOL/L (ref 136–145)
TRIGL SERPL-MCNC: 114 MG/DL (ref 0–150)
TSH SERPL DL<=0.05 MIU/L-ACNC: 1.35 UIU/ML (ref 0.27–4.2)
VIT B12 BLD-MCNC: 313 PG/ML (ref 211–946)
VLDLC SERPL-MCNC: 21 MG/DL (ref 5–40)
WBC NRBC COR # BLD: 4.88 10*3/MM3 (ref 3.4–10.8)

## 2023-06-12 PROCEDURE — 36415 COLL VENOUS BLD VENIPUNCTURE: CPT

## 2023-06-12 PROCEDURE — 83036 HEMOGLOBIN GLYCOSYLATED A1C: CPT

## 2023-06-12 PROCEDURE — 83525 ASSAY OF INSULIN: CPT

## 2023-06-12 PROCEDURE — 80050 GENERAL HEALTH PANEL: CPT

## 2023-06-12 PROCEDURE — 82746 ASSAY OF FOLIC ACID SERUM: CPT

## 2023-06-12 PROCEDURE — 80061 LIPID PANEL: CPT

## 2023-06-12 PROCEDURE — 82306 VITAMIN D 25 HYDROXY: CPT

## 2023-06-12 PROCEDURE — 82607 VITAMIN B-12: CPT

## 2023-06-13 LAB — INSULIN SERPL-ACNC: 10.3 UIU/ML (ref 2.6–24.9)

## 2023-11-08 DIAGNOSIS — M79.642 LEFT HAND PAIN: Primary | ICD-10-CM

## 2023-11-09 ENCOUNTER — OFFICE VISIT (OUTPATIENT)
Age: 38
End: 2023-11-09
Payer: COMMERCIAL

## 2023-11-09 ENCOUNTER — HOSPITAL ENCOUNTER (OUTPATIENT)
Dept: GENERAL RADIOLOGY | Facility: HOSPITAL | Age: 38
Discharge: HOME | End: 2023-11-09
Admitting: PLASTIC SURGERY
Payer: COMMERCIAL

## 2023-11-09 VITALS
WEIGHT: 150.1 LBS | BODY MASS INDEX: 22.75 KG/M2 | HEIGHT: 68 IN | SYSTOLIC BLOOD PRESSURE: 122 MMHG | DIASTOLIC BLOOD PRESSURE: 78 MMHG

## 2023-11-09 DIAGNOSIS — M79.642 LEFT HAND PAIN: ICD-10-CM

## 2023-11-09 DIAGNOSIS — M79.642 LEFT HAND PAIN: Primary | ICD-10-CM

## 2023-11-09 PROCEDURE — 73130 X-RAY EXAM OF HAND: CPT

## 2023-11-09 RX ORDER — TOBRAMYCIN AND DEXAMETHASONE 3; 1 MG/ML; MG/ML
SUSPENSION/ DROPS OPHTHALMIC
COMMUNITY
Start: 2023-10-27

## 2023-11-09 NOTE — PROGRESS NOTES
[facility]     Office Visit       Date: 11/09/2023   Patient Name: Dary Crawley  MRN: [id number]  YOB: 1985    Referring Physician: Referring, Self     Chief Complaint:   Chief Complaint   Patient presents with    Left Hand - Pain       History of Present Illness:   Dary Crawley is a 38 y.o. female right-hand-dominant presents with left hand pain after being struck in her volar hand by a baseball/7/2023.  She presents today with edema ecchymosis and persistent pain.  She denies numbness or tingling.  She denies wrist pain.  She denies loss of range of motion.  She is otherwise healthy.  She works as a sonographer.  She denies smoking.      Subjective   Review of Systems:   Review of Systems   Constitutional: Negative.  Negative for chills, fever, unexpected weight gain and unexpected weight loss.   HENT: Negative.  Negative for congestion, postnasal drip and rhinorrhea.    Eyes: Negative.  Negative for blurred vision.   Respiratory: Negative.  Negative for shortness of breath.    Cardiovascular: Negative.  Negative for leg swelling.   Gastrointestinal: Negative.  Negative for abdominal pain, nausea and vomiting.   Endocrine: Negative.    Genitourinary: Negative.  Negative for difficulty urinating.   Musculoskeletal:  Positive for arthralgias. Negative for gait problem, joint swelling and myalgias.   Skin: Negative.  Negative for wound.   Allergic/Immunologic: Negative.    Neurological: Negative.  Negative for dizziness, weakness, light-headedness and numbness.   Hematological: Negative.  Does not bruise/bleed easily.   Psychiatric/Behavioral: Negative.  Negative for depressed mood.    All other systems reviewed and are negative.       Pertinent review of systems per HPI.     I reviewed the patient's chief complaint, history of present illness, review of systems, past medical history, surgical history, family history,  "social history, medications and allergy list in the EMR on 11/09/2023 and agree with the findings above.    Objective    Vital Signs:   Vitals:    11/09/23 0817   BP: 122/78   Weight: 68.1 kg (150 lb 1.6 oz)   Height: 172 cm (67.72\")     BMI: BMI is within normal parameters. No other follow-up for BMI required.       General Appearance: No acute distress. Alert and oriented.     Chest:  Non-labored breathing on room air. Regular rate and rhythm.    Left upper Extremity Exam:    No palpable masses or visible lesions  Fingers are warm, well-perfused with appropriate capillary refill.  Palpable radial pulse.    Sensation intact to light touch in median, radial and ulnar nerve distributions.    Motor- Fires FPL, ulnar intrinsics, EPL/EDC w/ full active and passive range of motion. Strength intact.    Non-tender except for in the areas highlighted below    Mild edema and ecchymosis over the volar aspect of the left middle finger MCP.  Patient directly tender to palpation over the middle finger MCP..  Nontender over the flexor sheath.  Nontender proximally over the wrist.    She has full flexion and extension of the middle finger as well as the digits of the left hand.    Imaging/Studies:   Imaging Results (Last 24 Hours)       ** No results found for the last 24 hours. **            X-ray of the left hand from 11/9/2023 was independently reviewed by myself and demonstrates no evidence of bony abnormality.    Procedures:  Procedures    Quality Measures:   ACP:   ACP discussion was declined by the patient. Patient does not have an advance directive, declines further assistance.    Tobacco:   Dary Crawley  reports that she has never smoked. She has never used smokeless tobacco..    Assessment / Plan    Assessment/Plan:     There are no diagnoses linked to this encounter.     Dary Perkinss a 38 y.o. female who presents with:      ICD-10-CM ICD-9-CM   1. Left hand pain  M79.642 729.5     Patient presents with left hand " pain over the middle finger MCP after being struck by a baseball 2 days ago.  X-rays without bony abnormality.  Explained to patient that she likely has a bone bruise to the direct trauma that this may take several weeks to completely resolve.  Recommend that she ice, elevate use of NSAIDs for the pain and swelling as well as wrapped the left hand with Coban that seems to help.  She may  follow-up as needed in 4 to 6 weeks if she continues to have persistent pain    Follow Up:   Return if symptoms worsen or fail to improve.        Selvin Mullen MD  [facility]

## 2023-12-05 DIAGNOSIS — H04.123 DRY EYE SYNDROME OF BOTH EYES: Primary | ICD-10-CM

## 2023-12-05 RX ORDER — LOTEPREDNOL ETABONATE 5 MG/ML
1 SUSPENSION/ DROPS OPHTHALMIC 4 TIMES DAILY
Qty: 5 ML | Refills: 1 | Status: SHIPPED | OUTPATIENT
Start: 2023-12-05

## 2023-12-13 RX ORDER — ETONOGESTREL AND ETHINYL ESTRADIOL VAGINAL .015; .12 MG/D; MG/D
1 RING VAGINAL
Qty: 3 EACH | Refills: 0 | Status: SHIPPED | OUTPATIENT
Start: 2023-12-13

## 2023-12-14 ENCOUNTER — OFFICE VISIT (OUTPATIENT)
Dept: OBSTETRICS AND GYNECOLOGY | Facility: CLINIC | Age: 38
End: 2023-12-14
Payer: COMMERCIAL

## 2023-12-14 VITALS
BODY MASS INDEX: 23 KG/M2 | DIASTOLIC BLOOD PRESSURE: 70 MMHG | SYSTOLIC BLOOD PRESSURE: 118 MMHG | WEIGHT: 150 LBS | RESPIRATION RATE: 16 BRPM

## 2023-12-14 DIAGNOSIS — Z12.31 BREAST CANCER SCREENING BY MAMMOGRAM: ICD-10-CM

## 2023-12-14 DIAGNOSIS — Z01.419 WELL WOMAN EXAM WITH ROUTINE GYNECOLOGICAL EXAM: Primary | ICD-10-CM

## 2023-12-14 RX ORDER — ETONOGESTREL AND ETHINYL ESTRADIOL VAGINAL .015; .12 MG/D; MG/D
1 RING VAGINAL
Qty: 3 EACH | Refills: 4 | Status: SHIPPED | OUTPATIENT
Start: 2023-12-14

## 2023-12-14 NOTE — PROGRESS NOTES
Subjective   Chief Complaint   Patient presents with    Annual Exam     Dary Crawley is a 38 y.o. year old  presenting to be seen for her annual exam.     SEXUAL Hx:  She is currently sexually active.  In the past year there there has been NO new sexual partners.    Condoms are never used.  She would not like to be screened for STD's at today's exam.  Current birth control method: NuvaRing.  She is happy with her current method of contraception and does not want to discuss alternative methods of contraception.  MENSTRUAL Hx:  Patient's last menstrual period was 2023.  In the past 6 months her cycles have been regular, predictable and occur monthly.  Her menstrual flow is typically light.   Each month on average there are roughly 0 day(s) of very heavy flow.    Duration ~ 4-5 days.  Intermenstrual bleeding is absent.    Post-coital bleeding is absent.  Dysmenorrhea: none  PMS: none  Her cycles are not a source of concern for her that she wishes to discuss today.  HEALTH Hx:  She exercises regularly: yes.  She wears her seat belt: yes.  She has concerns about domestic violence: no.  OTHER THINGS SHE WANTS TO DISCUSS TODAY:  Nothing else    The following portions of the patient's history were reviewed and updated as appropriate:problem list, current medications, allergies, past family history, past medical history, past social history, and past surgical history.    Social History    Tobacco Use      Smoking status: Never      Smokeless tobacco: Never    Review of Systems  Constitutional POS: nothing reported    NEG: anorexia or night sweats   Genitourinary POS: urge incontinene is present but it IS NOT effecting her ADL's and urgency    NEG: dysuria or hematuria      Gastointestinal POS: nothing reported    NEG: bloating, change in bowel habits, melena, or reflux symptoms   Integument POS: nothing reported    NEG: moles that are changing in size, shape, color or rashes   Breast POS: nothing reported     NEG: persistent breast lump, skin dimpling, or nipple discharge        Objective   /70   Resp 16   Wt 68 kg (150 lb)   LMP 11/20/2023   BMI 23.00 kg/m²     General:  well developed; well nourished  no acute distress   Skin:  No suspicious lesions seen   Thyroid: normal to inspection and palpation   Breasts:  Examined in upright and supine position  Symmetric without masses or skin dimpling  Nipples normal without inversion, lesions or discharge  There are no palpable axillary nodes  Fibrocystic changes are present both breasts without a discrete mass at 12 o'clock    Abdomen: soft, non-tender; no masses  no umbilical or inguinal hernias are present  no hepato-splenomegaly   Pelvis: Clinical staff was present for exam  External genitalia:  normal appearance of the external genitalia including Bartholin's and [place]'s glands.  :  urethral meatus normal;  Vaginal:  normal pink mucosa without prolapse or lesions.  Cervix:  normal appearance.  Uterus:  normal size, shape and consistency.  Adnexa:  normal bimanual exam of the adnexa except for mild fullness in right adnexa  Rectal:  digital rectal exam not performed; anus visually normal appearing.        Assessment   Normal GYN exam  Bilateral fibrocystic breast changes  Right adnexal fullnes     Plan   Pap was done today.  If she does not receive the results of the Pap within 2 weeks  time, she was instructed to call to find out the results.  I explained to Dary that the recommendations for Pap smear interval in a low risk patient's has lengthened to 3 years time.  I encouraged her to be seen yearly for a full physical exam including breast and pelvic exam even during the off years when PAP's will not be performed.  She was encouraged to get yearly mammograms.  She should report any palpable breast lump(s) or skin changes regardless of mammographic findings.  I explained to Dary that notification regarding her mammogram results will come from the center  performing the study.  Our office will not be routinely calling with mammogram results.  It is her responsibility to make sure that the results from the mammogram are communicated to her by the breast center.  If she has any questions about the results, she is welcome to call our office anytime.  Continue current exercise regimen.   Prescription(s) for Nuvaring were refilled today   See updated telephone encounter for bilateral dx mmg and pelvic ultrasound.   The importance of keeping all planned follow-up and taking all medications as prescribed was emphasized.  Follow up for annual exam in ~ one year    New Medications Ordered This Visit   Medications    etonogestrel-ethinyl estradiol (NuvaRing) 0.12-0.015 MG/24HR vaginal ring     Sig: Insert 1 ring vaginally as directed and leave in place for 3 consecutive weeks, then remove for 1 week, before replacing with a new ring     Dispense:  3 each     Refill:  4     PT MUST KEEP APPT FOR ADDITIONAL REFILLS.        Orders Placed This Encounter   Procedures    Mammo Screening Digital Tomosynthesis Bilateral With CAD     Standing Status:   Future     Standing Expiration Date:   12/12/2024     Order Specific Question:   Reason for Exam:     Answer:   due for screening mammogram     Order Specific Question:   Patient Pregnant     Answer:   No     Order Specific Question:   Release to patient     Answer:   Routine Release [[id number]]         This note was electronically signed.    Micki Rodriguez MD  December 14, 2023

## 2023-12-18 ENCOUNTER — TELEPHONE (OUTPATIENT)
Dept: OBSTETRICS AND GYNECOLOGY | Facility: CLINIC | Age: 38
End: 2023-12-18
Payer: COMMERCIAL

## 2023-12-18 DIAGNOSIS — N94.9 ADNEXAL FULLNESS: ICD-10-CM

## 2023-12-18 DIAGNOSIS — N60.12 FIBROCYSTIC DISEASE OF BOTH BREASTS: Primary | ICD-10-CM

## 2023-12-18 DIAGNOSIS — N60.11 FIBROCYSTIC DISEASE OF BOTH BREASTS: Primary | ICD-10-CM

## 2023-12-19 LAB — REF LAB TEST METHOD: NORMAL

## 2023-12-19 NOTE — TELEPHONE ENCOUNTER
Pt informed and stated understanding.  Pt was then scheduled for an ultrasound on Friday 12/22/23 at 11:30 AM and is going to call to get her screening mammogram canceled and her diagnostic scheduled.

## 2023-12-19 NOTE — TELEPHONE ENCOUNTER
"Please inform patient based on her fibrocystic changes noted on breast exam at recent annual exam which can be completely normal I have ordered a bilateral diagnostic mammogram instead of screening mammogram. They will most likely have to reschedule her current screening mammogram. I apologize for any inconvenience.   In addition, I would like for her to be scheduled for pelvic ultrasound. She had some mild fullness felt during pelvic exam which can also be normal as sometimes it can simply be the bowel. The pelvic ultrasound will allow us to see both ovaries and make sure they look normal.   If she has any questions or concerns please let me know.     Orders Placed This Encounter   Procedures    Mammo diagnostic digital tomosynthesis bilateral w CAD     Standing Status:   Future     Standing Expiration Date:   12/18/2024     Order Specific Question:   Is an Ultrasound Breast required?  If 'Yes\", Please enter an order for the US Breast as well.     Answer:   Prn     Order Specific Question:   Reason for Exam:     Answer:   Due for screening mammogrma. Fibrocystic breast changes in bilateral breasts at 12 o'clock     Order Specific Question:   Patient Pregnant     Answer:   No     Order Specific Question:   Release to patient     Answer:   Routine Release [[id number]]    US Non-ob Transvaginal     Standing Status:   Future     Standing Expiration Date:   12/17/2024     Order Specific Question:   Reason for Exam:     Answer:   Right adnexal fullness     Order Specific Question:   Release to patient     Answer:   Routine Release [[id number]]         Thanks, Micki Rodriguez MD    "

## 2024-01-03 ENCOUNTER — TELEPHONE (OUTPATIENT)
Dept: OBSTETRICS AND GYNECOLOGY | Facility: CLINIC | Age: 39
End: 2024-01-03
Payer: COMMERCIAL

## 2024-01-03 NOTE — TELEPHONE ENCOUNTER
Please inform patient her recent pelvic ultrasound was overall normal. Let me know if she has any questions or concerns. I am sending a my chart message as well.     Thanks, Micki Rodriguez MD

## 2024-01-08 RX ORDER — FLUOXETINE HYDROCHLORIDE 20 MG/1
20 CAPSULE ORAL DAILY
Qty: 90 CAPSULE | Refills: 1 | Status: CANCELLED | OUTPATIENT
Start: 2024-01-08

## 2024-01-10 RX ORDER — FLUOXETINE HYDROCHLORIDE 20 MG/1
20 CAPSULE ORAL DAILY
Qty: 30 CAPSULE | Refills: 0 | Status: SHIPPED | OUTPATIENT
Start: 2024-01-10

## 2024-01-31 ENCOUNTER — HOSPITAL ENCOUNTER (OUTPATIENT)
Dept: ULTRASOUND IMAGING | Facility: HOSPITAL | Age: 39
Discharge: HOME | End: 2024-01-31
Payer: COMMERCIAL

## 2024-01-31 ENCOUNTER — HOSPITAL ENCOUNTER (OUTPATIENT)
Dept: MAMMOGRAPHY | Facility: HOSPITAL | Age: 39
Discharge: HOME | End: 2024-01-31
Payer: COMMERCIAL

## 2024-01-31 DIAGNOSIS — N60.11 FIBROCYSTIC DISEASE OF BOTH BREASTS: ICD-10-CM

## 2024-01-31 DIAGNOSIS — N60.12 FIBROCYSTIC DISEASE OF BOTH BREASTS: ICD-10-CM

## 2024-01-31 PROCEDURE — 77066 DX MAMMO INCL CAD BI: CPT | Performed by: RADIOLOGY

## 2024-01-31 PROCEDURE — 76642 ULTRASOUND BREAST LIMITED: CPT

## 2024-01-31 PROCEDURE — G0279 TOMOSYNTHESIS, MAMMO: HCPCS

## 2024-01-31 PROCEDURE — 76642 ULTRASOUND BREAST LIMITED: CPT | Performed by: RADIOLOGY

## 2024-01-31 PROCEDURE — 77066 DX MAMMO INCL CAD BI: CPT

## 2024-01-31 PROCEDURE — 77062 BREAST TOMOSYNTHESIS BI: CPT | Performed by: RADIOLOGY

## 2024-02-06 ENCOUNTER — TELEPHONE (OUTPATIENT)
Dept: OBSTETRICS AND GYNECOLOGY | Facility: CLINIC | Age: 39
End: 2024-02-06
Payer: COMMERCIAL

## 2024-02-06 NOTE — TELEPHONE ENCOUNTER
Mammo Diagnostic Digital Tomosynthesis Bilateral With CAD (01/31/2024 10:44)    Called and spoke with patient, informed her of Dr. Rodriguez's advisement as listed in above linked result notes, she verified understanding, is already scheduled, and has no further questions.

## 2024-04-29 ENCOUNTER — OFFICE VISIT (OUTPATIENT)
Dept: INTERNAL MEDICINE | Facility: CLINIC | Age: 39
End: 2024-04-29
Payer: COMMERCIAL

## 2024-04-29 VITALS
DIASTOLIC BLOOD PRESSURE: 72 MMHG | OXYGEN SATURATION: 97 % | HEART RATE: 84 BPM | HEIGHT: 67 IN | BODY MASS INDEX: 23.23 KG/M2 | SYSTOLIC BLOOD PRESSURE: 116 MMHG | WEIGHT: 148 LBS

## 2024-04-29 DIAGNOSIS — Z76.89 ENCOUNTER TO ESTABLISH CARE: Primary | ICD-10-CM

## 2024-04-29 DIAGNOSIS — E55.9 VITAMIN D DEFICIENCY: ICD-10-CM

## 2024-04-29 DIAGNOSIS — F41.9 ANXIETY: ICD-10-CM

## 2024-04-29 DIAGNOSIS — E28.2 PCOS (POLYCYSTIC OVARIAN SYNDROME): ICD-10-CM

## 2024-04-29 DIAGNOSIS — Z13.220 SCREENING FOR HYPERLIPIDEMIA: ICD-10-CM

## 2024-04-29 DIAGNOSIS — R73.01 IMPAIRED FASTING GLUCOSE: ICD-10-CM

## 2024-04-29 DIAGNOSIS — F32.A DEPRESSION, UNSPECIFIED DEPRESSION TYPE: ICD-10-CM

## 2024-04-29 PROCEDURE — 99214 OFFICE O/P EST MOD 30 MIN: CPT | Performed by: NURSE PRACTITIONER

## 2024-04-29 NOTE — PROGRESS NOTES
Office Note     Name: Dary Crawley    : 1985     MRN: [id number]     Chief Complaint  Establish Care (Insulin resistance )    Subjective     History of Present Illness:  Dary Crawley is a 39 y.o. female who presents today to establish care with a new provider.  Past medical medication history reviewed with the patient.  Patient does have a history of PCOS.  She reports having fertility issues when trying to conceive with her children.  She reports Clomid did not work for her.  She ended up doing fertility injections and IUI x 2.  She is concerned that she may have insulin resistance.  She has been checking her glucose at home.  She reports her fasting glucose levels have been ranging from 1 15-1 40 in the morning.  She does intermittent fasting.  She does eat dinner but fast after dinner until lunch the next day.  She feels that she eats a well-balanced diet.  She tries to avoid processed foods.  She reports her a.m. glucose this morning was 135.  She does workout regularly.  She has been walking and lifting weights consistently for the past few months.  She denies any family history of diabetes.  She denies any soda or sugary drink intake.  She only drinks water.  She has not previously been treated with metformin or other medication for her PCOS.  She denies any rash or skin changes.  She does have some facial hair that has been present for many years to her chin and abdominal area.  She reports onset of menses was around 13.  She reports her menstrual cycles have never been regular.  She currently has a NuvaRing.  She reports currently her menstrual cycles are very light and only last about 3 days.  She does have difficulty sleeping at times.  She does take a magnesium supplement to assist with sleep.  She also takes Prozac for anxiety and depression.  She does need a refill of this medication today.  She would like to have lab work done to evaluate for possible insulin resistance.  No further  complaints or concerns at this time.  Pleasant visit with the patient today.        Past Medical History:   Diagnosis Date    Anxiety     Depression     Female infertility     Headache     Injury of neck     Multiple gestation     Ovarian cyst     Polycystic ovary syndrome     PONV (postoperative nausea and vomiting)        Past Surgical History:   Procedure Laterality Date     SECTION  2011    WISDOM TOOTH EXTRACTION  2002       Social History     Socioeconomic History    Marital status:    Tobacco Use    Smoking status: Never     Passive exposure: Never    Smokeless tobacco: Never   Vaping Use    Vaping status: Never Used   Substance and Sexual Activity    Alcohol use: Never    Drug use: Never    Sexual activity: Yes     Partners: Male     Birth control/protection: Ring     Comment: Nuvaring         Current Outpatient Medications:     etonogestrel-ethinyl estradiol (NuvaRing) 0.12-0.015 MG/24HR vaginal ring, Insert 1 ring vaginally as directed and leave in place for 3 consecutive weeks, then remove for 1 week, before replacing with a new ring, Disp: 3 each, Rfl: 0    etonogestrel-ethinyl estradiol (NuvaRing) 0.12-0.015 MG/24HR vaginal ring, Insert 1 ring vaginally as directed and leave in place for 3 consecutive weeks, then remove for 1 week, before replacing with a new ring, Disp: 3 each, Rfl: 4    FLUoxetine (PROzac) 20 MG capsule, Take 1 capsule by mouth Daily. Needs appointment for further refills., Disp: 90 capsule, Rfl: 1    Lifitegrast (Xiidra) 5 % ophthalmic solution, Administer 1 drop to both eyes 2 (Two) Times a Day., Disp: 60 each, Rfl: 6    Magnesium 200 MG tablet, Take 1 capsule by mouth Daily., Disp: , Rfl:     multivitamin (THERAGRAN) tablet tablet, Take 1 tablet by mouth Daily., Disp: , Rfl:     Omega-3 Fatty Acids (fish oil) 1000 MG capsule capsule, Take 1 capsule by mouth Daily With Breakfast., Disp: , Rfl:     propranolol (INDERAL) 20 MG tablet, Take 0.5-1 tablets by mouth 3  "(Three) Times a Day As Needed for anxiety, Disp: 60 tablet, Rfl: 5    amoxicillin (AMOXIL) 500 MG capsule, Take 1 capsule by mouth Every 6 (Six) Hours as directed. (Patient not taking: Reported on 4/29/2024), Disp: 28 capsule, Rfl: 0    fluticasone (FLONASE) 50 MCG/ACT nasal spray, Use 2 sprays in each nostril daily. (Patient not taking: Reported on 4/29/2024), Disp: 16 g, Rfl: 11    Objective     Vital Signs  /72 (BP Location: Left arm, Patient Position: Sitting, Cuff Size: Adult)   Pulse 84   Ht 170.2 cm (67\")   Wt 67.1 kg (148 lb)   SpO2 97%   BMI 23.18 kg/m²   Estimated body mass index is 23.18 kg/m² as calculated from the following:    Height as of this encounter: 170.2 cm (67\").    Weight as of this encounter: 67.1 kg (148 lb).    BMI is within normal parameters. No other follow-up for BMI required.      Physical Exam  Constitutional:       General: She is not in acute distress.     Appearance: Normal appearance. She is not ill-appearing.   HENT:      Head: Normocephalic and atraumatic.      Nose: Nose normal.   Eyes:      Extraocular Movements: Extraocular movements intact.      Conjunctiva/sclera: Conjunctivae normal.      Pupils: Pupils are equal, round, and reactive to light.   Cardiovascular:      Rate and Rhythm: Normal rate and regular rhythm.      Heart sounds: Normal heart sounds.   Pulmonary:      Effort: Pulmonary effort is normal. No respiratory distress.      Breath sounds: Normal breath sounds.   Musculoskeletal:         General: Normal range of motion.      Cervical back: Neck supple.      Right lower leg: No edema.      Left lower leg: No edema.   Skin:     General: Skin is warm and dry.   Neurological:      General: No focal deficit present.      Mental Status: She is alert and oriented to person, place, and time. Mental status is at baseline.   Psychiatric:         Mood and Affect: Mood normal.         Behavior: Behavior normal.         Thought Content: Thought content normal.    "      Judgment: Judgment normal.          Assessment and Plan     Diagnoses and all orders for this visit:    1. Encounter to establish care (Primary)    2. Anxiety  -     FLUoxetine (PROzac) 20 MG capsule; Take 1 capsule by mouth Daily. Needs appointment for further refills.  Dispense: 90 capsule; Refill: 1    3. Depression, unspecified depression type  -     FLUoxetine (PROzac) 20 MG capsule; Take 1 capsule by mouth Daily. Needs appointment for further refills.  Dispense: 90 capsule; Refill: 1    4. Impaired fasting glucose  -     Hemoglobin A1c; Future  -     Comprehensive metabolic panel; Future  -     Insulin, Random; Future    5. Screening for hyperlipidemia  -     Lipid Panel; Future    6. Vitamin D deficiency  -     Vitamin D,25-Hydroxy; Future    7. PCOS (polycystic ovarian syndrome)        Follow Up  Return for Next scheduled follow up.    AYANA Hermosillo    Part of this note may be an electronic transcription/translation of spoken language to printed text using the Dragon Dictation System.

## 2024-04-30 PROBLEM — F41.9 ANXIETY: Status: ACTIVE | Noted: 2024-04-30

## 2024-04-30 PROBLEM — E28.2 PCOS (POLYCYSTIC OVARIAN SYNDROME): Status: ACTIVE | Noted: 2024-04-30

## 2024-04-30 PROBLEM — R73.01 IMPAIRED FASTING GLUCOSE: Status: ACTIVE | Noted: 2024-04-30

## 2024-04-30 PROBLEM — F32.A DEPRESSION: Status: ACTIVE | Noted: 2024-04-30

## 2024-04-30 PROBLEM — E55.9 VITAMIN D DEFICIENCY: Status: ACTIVE | Noted: 2024-04-30

## 2024-04-30 RX ORDER — FLUOXETINE HYDROCHLORIDE 20 MG/1
20 CAPSULE ORAL DAILY
Qty: 90 CAPSULE | Refills: 1 | Status: SHIPPED | OUTPATIENT
Start: 2024-04-30

## 2024-05-01 ENCOUNTER — LAB (OUTPATIENT)
Dept: LAB | Facility: HOSPITAL | Age: 39
End: 2024-05-01
Payer: COMMERCIAL

## 2024-05-01 DIAGNOSIS — R73.01 IMPAIRED FASTING GLUCOSE: ICD-10-CM

## 2024-05-01 DIAGNOSIS — E55.9 VITAMIN D DEFICIENCY: ICD-10-CM

## 2024-05-01 DIAGNOSIS — Z13.220 SCREENING FOR HYPERLIPIDEMIA: ICD-10-CM

## 2024-05-01 LAB
25(OH)D3 SERPL-MCNC: 56.7 NG/ML (ref 30–100)
ALBUMIN SERPL-MCNC: 4.2 G/DL (ref 3.5–5.2)
ALBUMIN/GLOB SERPL: 1.4 G/DL
ALP SERPL-CCNC: 42 U/L (ref 39–117)
ALT SERPL W P-5'-P-CCNC: 24 U/L (ref 1–33)
ANION GAP SERPL CALCULATED.3IONS-SCNC: 7.6 MMOL/L (ref 5–15)
AST SERPL-CCNC: 11 U/L (ref 1–32)
BILIRUB SERPL-MCNC: 0.4 MG/DL (ref 0–1.2)
BUN SERPL-MCNC: 18 MG/DL (ref 6–20)
BUN/CREAT SERPL: 16.5 (ref 7–25)
CALCIUM SPEC-SCNC: 9.4 MG/DL (ref 8.6–10.5)
CHLORIDE SERPL-SCNC: 107 MMOL/L (ref 98–107)
CHOLEST SERPL-MCNC: 214 MG/DL (ref 0–200)
CO2 SERPL-SCNC: 22.4 MMOL/L (ref 22–29)
CREAT SERPL-MCNC: 1.09 MG/DL (ref 0.57–1)
EGFRCR SERPLBLD CKD-EPI 2021: 65.6 ML/MIN/1.73
GLOBULIN UR ELPH-MCNC: 3.1 GM/DL
GLUCOSE SERPL-MCNC: 95 MG/DL (ref 65–99)
HBA1C MFR BLD: 5.1 % (ref 4.8–5.6)
HDLC SERPL-MCNC: 46 MG/DL (ref 40–60)
LDLC SERPL CALC-MCNC: 146 MG/DL (ref 0–100)
LDLC/HDLC SERPL: 3.12 {RATIO}
POTASSIUM SERPL-SCNC: 4.5 MMOL/L (ref 3.5–5.2)
PROT SERPL-MCNC: 7.3 G/DL (ref 6–8.5)
SODIUM SERPL-SCNC: 137 MMOL/L (ref 136–145)
TRIGL SERPL-MCNC: 123 MG/DL (ref 0–150)
VLDLC SERPL-MCNC: 22 MG/DL (ref 5–40)

## 2024-05-01 PROCEDURE — 83525 ASSAY OF INSULIN: CPT

## 2024-05-01 PROCEDURE — 36415 COLL VENOUS BLD VENIPUNCTURE: CPT

## 2024-05-01 PROCEDURE — 83036 HEMOGLOBIN GLYCOSYLATED A1C: CPT

## 2024-05-01 PROCEDURE — 80061 LIPID PANEL: CPT

## 2024-05-01 PROCEDURE — 82306 VITAMIN D 25 HYDROXY: CPT

## 2024-05-01 PROCEDURE — 80053 COMPREHEN METABOLIC PANEL: CPT

## 2024-05-03 ENCOUNTER — PATIENT ROUNDING (BHMG ONLY) (OUTPATIENT)
Dept: INTERNAL MEDICINE | Facility: CLINIC | Age: 39
End: 2024-05-03
Payer: COMMERCIAL

## 2024-05-03 NOTE — PROGRESS NOTES
A My-chart message has been sent to the patient [place] [facility] [place]

## 2024-05-07 LAB — INSULIN SERPL-ACNC: 5.5 UIU/ML

## 2024-08-20 ENCOUNTER — HOSPITAL ENCOUNTER (OUTPATIENT)
Dept: ULTRASOUND IMAGING | Facility: HOSPITAL | Age: 39
Discharge: HOME | End: 2024-08-20
Payer: COMMERCIAL

## 2024-08-20 ENCOUNTER — HOSPITAL ENCOUNTER (OUTPATIENT)
Dept: MAMMOGRAPHY | Facility: HOSPITAL | Age: 39
Discharge: HOME | End: 2024-08-20
Payer: COMMERCIAL

## 2024-08-20 DIAGNOSIS — R92.8 ABNORMAL MAMMOGRAM: ICD-10-CM

## 2024-08-20 PROCEDURE — G0279 TOMOSYNTHESIS, MAMMO: HCPCS

## 2024-08-20 PROCEDURE — 77066 DX MAMMO INCL CAD BI: CPT | Performed by: RADIOLOGY

## 2024-08-20 PROCEDURE — 76642 ULTRASOUND BREAST LIMITED: CPT

## 2024-08-20 PROCEDURE — 77062 BREAST TOMOSYNTHESIS BI: CPT | Performed by: RADIOLOGY

## 2024-08-20 PROCEDURE — 76642 ULTRASOUND BREAST LIMITED: CPT | Performed by: RADIOLOGY

## 2024-08-20 PROCEDURE — 77066 DX MAMMO INCL CAD BI: CPT

## 2024-12-30 DIAGNOSIS — M62.838 MUSCLE SPASM OF RIGHT SHOULDER: Primary | ICD-10-CM

## 2024-12-30 RX ORDER — CYCLOBENZAPRINE HCL 10 MG
10 TABLET ORAL EVERY 8 HOURS PRN
Qty: 30 TABLET | Refills: 1 | Status: SHIPPED | OUTPATIENT
Start: 2024-12-30

## 2025-01-30 ENCOUNTER — OFFICE VISIT (OUTPATIENT)
Dept: OBSTETRICS AND GYNECOLOGY | Facility: CLINIC | Age: 40
End: 2025-01-30
Payer: COMMERCIAL

## 2025-01-30 VITALS
HEIGHT: 67 IN | WEIGHT: 148 LBS | DIASTOLIC BLOOD PRESSURE: 70 MMHG | BODY MASS INDEX: 23.23 KG/M2 | SYSTOLIC BLOOD PRESSURE: 110 MMHG

## 2025-01-30 DIAGNOSIS — Z01.419 WELL WOMAN EXAM WITH ROUTINE GYNECOLOGICAL EXAM: Primary | ICD-10-CM

## 2025-01-30 RX ORDER — ETONOGESTREL AND ETHINYL ESTRADIOL VAGINAL RING .015; .12 MG/D; MG/D
1 RING VAGINAL
Qty: 3 EACH | Refills: 4 | Status: SHIPPED | OUTPATIENT
Start: 2025-01-30

## 2025-01-30 NOTE — PROGRESS NOTES
Subjective   Chief Complaint   Patient presents with    Annual Exam     No c/c     Dary Kirstin Crawley is a 39 y.o. year old  presenting to be seen for her annual exam.     SEXUAL Hx:  She is currently sexually active.  In the past year there there has been NO new sexual partners.    Condoms are never used.  She would not like to be screened for STD's at today's exam.  Current birth control method: NuvaRing.  She is happy with her current method of contraception and does not want to discuss alternative methods of contraception.  MENSTRUAL Hx:  Patient's last menstrual period was 2025 (exact date).  In the past 6 months her cycles have been regular, predictable and occur monthly.  Her menstrual flow is typically light.   Each month on average there are roughly 0 day(s) of very heavy flow.    Duration ~ 4 days.   Intermenstrual bleeding is absent.    Post-coital bleeding is absent.  Dysmenorrhea: none  PMS: none  Her cycles are not a source of concern for her that she wishes to discuss today.  HEALTH Hx:  She exercises regularly: yes.  She wears her seat belt: yes.  She has concerns about domestic violence: no.  OTHER THINGS SHE WANTS TO DISCUSS TODAY:  Nothing else    The following portions of the patient's history were reviewed and updated as appropriate:problem list, current medications, allergies, past family history, past medical history, past social history, and past surgical history.    Social History    Tobacco Use      Smoking status: Never        Passive exposure: Never      Smokeless tobacco: Never    Review of Systems  Constitutional POS: nothing reported    NEG: anorexia or night sweats   Genitourinary POS: urgency    NEG: dysuria or hematuria      Gastointestinal POS: nothing reported    NEG: bloating, change in bowel habits, melena, or reflux symptoms   Integument POS: nothing reported    NEG: moles that are changing in size, shape, color or rashes   Breast POS: nothing reported    NEG:  "persistent breast lump, skin dimpling, or nipple discharge        Objective   /70 (BP Location: Right arm, Patient Position: Sitting, Cuff Size: Adult)   Ht 170.2 cm (67\")   Wt 67.1 kg (148 lb)   LMP 01/22/2025 (Exact Date)   BMI 23.18 kg/m²     General:  well developed; well nourished  no acute distress  mentation appropriate   Skin:  No suspicious lesions seen   Thyroid: normal to inspection and palpation   Breasts:  Examined in supine position  Symmetric without masses or skin dimpling  Nipples normal without inversion, lesions or discharge  There are no palpable axillary nodes   Abdomen: soft, non-tender; no masses  no umbilical or inguinal hernias are present  no hepato-splenomegaly   Pelvis: Clinical staff was present for exam  External genitalia:  normal appearance of the external genitalia including [place]'s and [place]'s glands.  :  urethral meatus normal; urethra normal:  Vaginal:  normal pink mucosa without prolapse or lesions.  Cervix:  normal appearance.  Uterus:  normal size, shape and consistency.  Adnexa:  normal bimanual exam of the adnexa.  Rectal:  digital rectal exam not performed; anus visually normal appearing.        Assessment   Normal GYN exam  Surveillance of nuva ring     Plan   Pap was not done today.  I explained to Dary that the recommendations for Pap smear interval in a low risk patient has lengthened to 3 years time.  I told Dary she still needs to be seen in our office yearly for a full physical including breast and pelvic exam.  Continue current exercise regimen.   She was encouraged to get yearly mammograms.  She should report any palpable breast lump(s) or skin changes regardless of mammographic findings.  I explained to Dary that notification regarding her mammogram results will come from the center performing the study.  Our office will not be routinely calling with mammogram results.  It is her responsibility to make sure that the results from the mammogram are " communicated to her by the breast center.  If she has any questions about the results, she is welcome to call our office anytime. She has follow up breast imaging scheduled 2/21/25.  Prescription(s) for Nuvaring were refilled today   The importance of keeping all planned follow-up and taking all medications as prescribed was emphasized.  Follow up for annual exam in ~ one year    New Medications Ordered This Visit   Medications    etonogestrel-ethinyl estradiol (NuvaRing) 0.12-0.015 MG/24HR vaginal ring     Sig: Insert 1 ring vaginally as directed and leave in place for 3 consecutive weeks, then remove for 1 week, before replacing with a new ring     Dispense:  3 each     Refill:  4     Please fill as generic          This note was electronically signed.    Micki Rodriguez MD  January 30, 2025

## 2025-02-06 LAB
NCCN CRITERIA FLAG: NORMAL
TYRER CUZICK SCORE: 10.9

## 2025-03-25 ENCOUNTER — HOSPITAL ENCOUNTER (OUTPATIENT)
Dept: MAMMOGRAPHY | Facility: HOSPITAL | Age: 40
Discharge: HOME | End: 2025-03-25
Admitting: RADIOLOGY
Payer: COMMERCIAL

## 2025-03-25 DIAGNOSIS — R92.8 ABNORMAL MAMMOGRAM: ICD-10-CM

## 2025-03-25 PROCEDURE — 77066 DX MAMMO INCL CAD BI: CPT

## 2025-03-25 PROCEDURE — 77066 DX MAMMO INCL CAD BI: CPT | Performed by: RADIOLOGY

## 2025-08-20 ENCOUNTER — TRANSCRIBE ORDERS (OUTPATIENT)
Dept: PHYSICAL THERAPY | Facility: CLINIC | Age: 40
End: 2025-08-20
Payer: COMMERCIAL

## 2025-08-20 ENCOUNTER — TREATMENT (OUTPATIENT)
Dept: PHYSICAL THERAPY | Facility: CLINIC | Age: 40
End: 2025-08-20
Payer: COMMERCIAL

## 2025-08-20 DIAGNOSIS — S16.1XXA STRAIN OF NECK MUSCLE, INITIAL ENCOUNTER: ICD-10-CM

## 2025-08-20 DIAGNOSIS — M25.511 CHRONIC RIGHT SHOULDER PAIN: ICD-10-CM

## 2025-08-20 DIAGNOSIS — S56.911A FOREARM STRAIN, RIGHT, INITIAL ENCOUNTER: ICD-10-CM

## 2025-08-20 DIAGNOSIS — G89.29 CHRONIC RIGHT SHOULDER PAIN: ICD-10-CM

## 2025-08-20 DIAGNOSIS — S56.911A STRAIN OF RIGHT FOREARM, INITIAL ENCOUNTER: ICD-10-CM

## 2025-08-20 DIAGNOSIS — S16.1XXA STRAIN OF NECK MUSCLE, INITIAL ENCOUNTER: Primary | ICD-10-CM

## 2025-08-20 DIAGNOSIS — M62.838 MUSCLE SPASM OF RIGHT SHOULDER: ICD-10-CM

## 2025-08-20 DIAGNOSIS — S46.911A STRAIN OF RIGHT SHOULDER, INITIAL ENCOUNTER: Primary | ICD-10-CM

## 2025-08-20 DIAGNOSIS — S46.911A STRAIN OF RIGHT SHOULDER, INITIAL ENCOUNTER: ICD-10-CM

## 2025-08-20 PROCEDURE — 97162 PT EVAL MOD COMPLEX 30 MIN: CPT | Performed by: PHYSICAL THERAPIST

## 2025-08-20 PROCEDURE — 97535 SELF CARE MNGMENT TRAINING: CPT | Performed by: PHYSICAL THERAPIST

## 2025-08-20 PROCEDURE — 97110 THERAPEUTIC EXERCISES: CPT | Performed by: PHYSICAL THERAPIST

## 2025-08-21 RX ORDER — CYCLOBENZAPRINE HCL 10 MG
10 TABLET ORAL EVERY 8 HOURS PRN
Qty: 30 TABLET | Refills: 1 | Status: SHIPPED | OUTPATIENT
Start: 2025-08-21

## 2025-08-25 ENCOUNTER — TREATMENT (OUTPATIENT)
Dept: PHYSICAL THERAPY | Facility: CLINIC | Age: 40
End: 2025-08-25
Payer: COMMERCIAL

## 2025-08-25 DIAGNOSIS — S16.1XXA STRAIN OF NECK MUSCLE, INITIAL ENCOUNTER: ICD-10-CM

## 2025-08-25 DIAGNOSIS — S46.911A STRAIN OF RIGHT SHOULDER, INITIAL ENCOUNTER: ICD-10-CM

## 2025-08-25 DIAGNOSIS — S56.911A STRAIN OF RIGHT FOREARM, INITIAL ENCOUNTER: ICD-10-CM

## 2025-08-25 DIAGNOSIS — M25.511 CHRONIC RIGHT SHOULDER PAIN: Primary | ICD-10-CM

## 2025-08-25 DIAGNOSIS — G89.29 CHRONIC RIGHT SHOULDER PAIN: Primary | ICD-10-CM

## 2025-08-25 PROCEDURE — 97110 THERAPEUTIC EXERCISES: CPT | Performed by: PHYSICAL THERAPIST

## 2025-08-25 PROCEDURE — 97140 MANUAL THERAPY 1/> REGIONS: CPT | Performed by: PHYSICAL THERAPIST

## 2025-08-25 PROCEDURE — 97112 NEUROMUSCULAR REEDUCATION: CPT | Performed by: PHYSICAL THERAPIST

## 2025-08-27 ENCOUNTER — TREATMENT (OUTPATIENT)
Dept: PHYSICAL THERAPY | Facility: CLINIC | Age: 40
End: 2025-08-27
Payer: COMMERCIAL

## 2025-08-27 DIAGNOSIS — S16.1XXA STRAIN OF NECK MUSCLE, INITIAL ENCOUNTER: ICD-10-CM

## 2025-08-27 DIAGNOSIS — G89.29 CHRONIC RIGHT SHOULDER PAIN: Primary | ICD-10-CM

## 2025-08-27 DIAGNOSIS — S46.911A STRAIN OF RIGHT SHOULDER, INITIAL ENCOUNTER: ICD-10-CM

## 2025-08-27 DIAGNOSIS — M25.511 CHRONIC RIGHT SHOULDER PAIN: Primary | ICD-10-CM

## 2025-08-27 DIAGNOSIS — S56.911A STRAIN OF RIGHT FOREARM, INITIAL ENCOUNTER: ICD-10-CM

## 2025-08-27 PROCEDURE — 97110 THERAPEUTIC EXERCISES: CPT | Performed by: PHYSICAL THERAPIST

## 2025-08-27 PROCEDURE — 97140 MANUAL THERAPY 1/> REGIONS: CPT | Performed by: PHYSICAL THERAPIST
